# Patient Record
Sex: MALE | NOT HISPANIC OR LATINO | Employment: FULL TIME | URBAN - NONMETROPOLITAN AREA
[De-identification: names, ages, dates, MRNs, and addresses within clinical notes are randomized per-mention and may not be internally consistent; named-entity substitution may affect disease eponyms.]

---

## 2021-04-15 ENCOUNTER — OFFICE VISIT (OUTPATIENT)
Dept: FAMILY MEDICINE CLINIC | Facility: CLINIC | Age: 41
End: 2021-04-15

## 2021-04-15 ENCOUNTER — LAB (OUTPATIENT)
Dept: LAB | Facility: HOSPITAL | Age: 41
End: 2021-04-15

## 2021-04-15 VITALS
BODY MASS INDEX: 31.71 KG/M2 | DIASTOLIC BLOOD PRESSURE: 92 MMHG | OXYGEN SATURATION: 98 % | SYSTOLIC BLOOD PRESSURE: 142 MMHG | HEART RATE: 80 BPM | HEIGHT: 67 IN | WEIGHT: 202 LBS | TEMPERATURE: 97.2 F

## 2021-04-15 DIAGNOSIS — Z13.220 SCREENING FOR HYPERLIPIDEMIA: ICD-10-CM

## 2021-04-15 DIAGNOSIS — Z76.89 ENCOUNTER TO ESTABLISH CARE: Primary | ICD-10-CM

## 2021-04-15 DIAGNOSIS — I10 ESSENTIAL HYPERTENSION: ICD-10-CM

## 2021-04-15 DIAGNOSIS — Z13.1 SCREENING FOR DIABETES MELLITUS (DM): ICD-10-CM

## 2021-04-15 DIAGNOSIS — Z13.29 SCREENING FOR THYROID DISORDER: ICD-10-CM

## 2021-04-15 DIAGNOSIS — Z71.6 ENCOUNTER FOR SMOKING CESSATION COUNSELING: ICD-10-CM

## 2021-04-15 LAB
CHOLEST SERPL-MCNC: 232 MG/DL (ref 0–200)
HBA1C MFR BLD: 4.5 % (ref 4.8–5.6)
HDLC SERPL-MCNC: 50 MG/DL (ref 40–60)
LDLC SERPL CALC-MCNC: 163 MG/DL (ref 0–100)
LDLC/HDLC SERPL: 3.2 {RATIO}
TRIGL SERPL-MCNC: 109 MG/DL (ref 0–150)
TSH SERPL DL<=0.05 MIU/L-ACNC: 1.61 UIU/ML (ref 0.27–4.2)
VLDLC SERPL-MCNC: 19 MG/DL (ref 5–40)

## 2021-04-15 PROCEDURE — 80061 LIPID PANEL: CPT | Performed by: STUDENT IN AN ORGANIZED HEALTH CARE EDUCATION/TRAINING PROGRAM

## 2021-04-15 PROCEDURE — 84443 ASSAY THYROID STIM HORMONE: CPT | Performed by: STUDENT IN AN ORGANIZED HEALTH CARE EDUCATION/TRAINING PROGRAM

## 2021-04-15 PROCEDURE — 36415 COLL VENOUS BLD VENIPUNCTURE: CPT | Performed by: STUDENT IN AN ORGANIZED HEALTH CARE EDUCATION/TRAINING PROGRAM

## 2021-04-15 PROCEDURE — 83036 HEMOGLOBIN GLYCOSYLATED A1C: CPT | Performed by: STUDENT IN AN ORGANIZED HEALTH CARE EDUCATION/TRAINING PROGRAM

## 2021-04-15 PROCEDURE — 99203 OFFICE O/P NEW LOW 30 MIN: CPT | Performed by: STUDENT IN AN ORGANIZED HEALTH CARE EDUCATION/TRAINING PROGRAM

## 2021-04-15 RX ORDER — LORATADINE 10 MG/1
10 TABLET ORAL DAILY
COMMUNITY
Start: 2021-03-29

## 2021-04-15 RX ORDER — NICOTINE 21 MG/24HR
1 PATCH, TRANSDERMAL 24 HOURS TRANSDERMAL EVERY 24 HOURS
Qty: 28 EACH | Refills: 0 | Status: SHIPPED | OUTPATIENT
Start: 2021-04-15 | End: 2023-01-12

## 2021-04-15 RX ORDER — LOSARTAN POTASSIUM 50 MG/1
50 TABLET ORAL DAILY
Qty: 30 TABLET | Refills: 1 | Status: SHIPPED | OUTPATIENT
Start: 2021-04-15 | End: 2023-01-12 | Stop reason: SDUPTHER

## 2021-04-15 RX ORDER — LISINOPRIL 5 MG/1
5 TABLET ORAL DAILY
COMMUNITY
Start: 2021-03-29 | End: 2021-04-15

## 2021-04-15 NOTE — PROGRESS NOTES
I have reviewed the notes, assessments, and/or procedures performed by Essie Barraza MD, I concur with her/his documentation and assessment and plan for [facility].                This document has been electronically signed by Bhupendra Mckeon MD on April 15, 2021 17:00 CDT

## 2021-04-15 NOTE — PROGRESS NOTES
Family Medicine Residency  Essie Barraza MD    Subjective:     Laz Castillo is a 41 y.o. male who presents today to [facility] care. Patient has history of htn. He is currently taking Lisinopril 5 mg tablet over the last 6 months. He has been having dry cough over the last 2 months and has been using otc cough syrup with no relief. He has no complaints today.     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 Smokes cig pack a day >20 yrs. Tried to quit with e-cig. Would like to try nicotin patch. Goal to quit in 3 months. No ilicit drugs. Occasional etoh.  Patient is a an immigrant from [place] and lives alone here. Hasn't seen his family in over 10 yr.     Family history : father  with kidney disease at the age of 78. Mother has DM, htn and liver problem. Brother has kidney and DM issues. One of the sister has htn, lung disease.     Surgerical history: phimosis in 2021.    No known allergies.     The following portions of the patient's history were reviewed and updated as appropriate: allergies, current medications, past family history, past medical history, past social history, past surgical history and problem list.    Past Medical Hx:  History reviewed. No pertinent past medical history.    Past Surgical Hx:  History reviewed. No pertinent surgical history.    Current Meds:    Current Outpatient Medications:   •  loratadine (CLARITIN) 10 MG tablet, Take 10 mg by mouth Daily., Disp: , Rfl:   •  losartan (Cozaar) 50 MG tablet, Take 1 tablet by mouth Daily., Disp: 30 tablet, Rfl: 1  •  nicotine (NICODERM CQ) 21 MG/24HR patch, Place 1 patch on the skin as directed by provider Daily., Disp: 28 each, Rfl: 0    Allergies:  No Known Allergies    Family Hx:  History reviewed. No pertinent family history.     Social History:  Social History     Socioeconomic History   • Marital status:      Spouse name: Not on file   • Number of children: Not on file   • Years of education: Not on file  "  • Highest education level: Not on file   Tobacco Use   • Smoking status: Never Smoker   • Smokeless tobacco: Never Used   Substance and Sexual Activity   • Alcohol use: Yes     Comment: sometimes   • Drug use: Never   • Sexual activity: Defer       Review of Systems  Review of Systems   Constitutional: Negative for activity change, appetite change, chills, diaphoresis and fatigue.   HENT: Negative for congestion.    Eyes: Negative for visual disturbance.   Respiratory: Positive for cough. Negative for chest tightness, shortness of breath and wheezing.    Cardiovascular: Negative for chest pain, palpitations and leg swelling.   Gastrointestinal: Negative for abdominal pain, diarrhea, nausea and vomiting.   Genitourinary: Negative for difficulty urinating, frequency and urgency.   Musculoskeletal: Negative for arthralgias and myalgias.   Skin: Negative for color change.   Neurological: Negative for dizziness, weakness and light-headedness.   Psychiatric/Behavioral: Negative for behavioral problems, confusion and sleep disturbance. The patient is not nervous/anxious.        Objective:     /92   Pulse 80   Temp 97.2 °F (36.2 °C)   Ht 170.2 cm (67\")   Wt 91.6 kg (202 lb)   SpO2 98%   BMI 31.64 kg/m²   Physical Exam  Vitals and nursing note reviewed.   Constitutional:       General: He is not in acute distress.     Appearance: Normal appearance. He is normal weight. He is not ill-appearing or toxic-appearing.   HENT:      Head: Normocephalic and atraumatic.      Right Ear: External ear normal.      Left Ear: External ear normal.      Nose: Nose normal.      Mouth/Throat:      Mouth: Mucous membranes are moist.      Pharynx: Oropharynx is clear.   Eyes:      Extraocular Movements: Extraocular movements intact.      Conjunctiva/sclera: Conjunctivae normal.      Pupils: Pupils are equal, round, and reactive to light.   Cardiovascular:      Rate and Rhythm: Normal rate and regular rhythm.      Pulses: Normal " pulses.      Heart sounds: Normal heart sounds.   Pulmonary:      Effort: No respiratory distress.      Breath sounds: Normal breath sounds. No wheezing.   Abdominal:      General: Bowel sounds are normal.      Palpations: Abdomen is soft.      Tenderness: There is no abdominal tenderness. There is no guarding.   Musculoskeletal:         General: Normal range of motion.      Right lower leg: No edema.      Left lower leg: No edema.   Skin:     General: Skin is warm and dry.      Capillary Refill: Capillary refill takes less than 2 seconds.   Neurological:      General: No focal deficit present.      Mental Status: He is alert and oriented to person, place, and time.      Motor: No weakness.   Psychiatric:         Mood and Affect: Mood normal.         Behavior: Behavior normal.          Assessment/Plan:   41 yr male with no significant past medical history presents today to establish care at the clinic. Patient requested for routine labs to be done today. He is motivated to quit smoking and would like try Nicotine patches. >20 mins was spent counseling patient regarding smoking cessation.  Since patient is having some dry cough since starting the Lisinopril, will switch it to Losartan and see if the cough improves. In addition, patient is a chronic smoker therefore will order chest x-ray to rule out any other etiologies.     Diagnoses and all orders for this visit:    1. Encounter to establish care (Primary)    2. Screening for thyroid disorder  -     TSH    3. Screening for hyperlipidemia  -     Lipid panel    4. Screening for diabetes mellitus (DM)  -     Hemoglobin A1c    5. Essential hypertension  -     losartan (Cozaar) 50 MG tablet; Take 1 tablet by mouth Daily.  Dispense: 30 tablet; Refill: 1    6. Encounter for smoking cessation counseling  -     XR Chest PA & Lateral  -     nicotine (NICODERM CQ) 21 MG/24HR patch; Place 1 patch on the skin as directed by provider Daily.  Dispense: 28 each; Refill:  0        · Rx changes: changed from Lisinopril to Lorsartan   · Patient Education: smoking cessation   · Compliance at present is estimated to be good.   · Efforts to improve compliance (if necessary) will be directed at increased exercise.    Depression screening: Up to date; last screen      Follow-up:     Return in about 4 weeks (around 5/13/2021), or if symptoms worsen or fail to improve, for Recheck.    Preventative:  Health Maintenance   Topic Date Due   • ANNUAL PHYSICAL  Never done   • COVID-19 Vaccine (1) Never done   • INFLUENZA VACCINE  08/01/2021   • TDAP/TD VACCINES (2 - Td) 12/10/2028   • HEPATITIS C SCREENING  Completed   • Pneumococcal Vaccine 0-64  Aged Out       Recommended: none  Vaccine Counseling: N/A    Weight  -Class: Obese Class I: 30-34.9kg/m2  -Patient's Body mass index is 31.64 kg/m². BMI is within normal parameters. No follow-up required..   eat more fruits and vegetables, decrease soda or juice intake, increase water intake, increase physical activity, reduce screen time, reduce portion size, cut out extra servings and reduce fast food intake    Alcohol use:  reports current alcohol use.  Nicotine status  reports that he has never smoked. He has never used smokeless tobacco.    Goals    None         RISK SCORE: 2        Essie Barraza MD PGY-1  [facility] Family Medicine Residency   This document has been electronically signed by Essie Barraza MD on April 15, 2021 16:52 CDT

## 2021-08-06 ENCOUNTER — TELEPHONE (OUTPATIENT)
Dept: FAMILY MEDICINE CLINIC | Facility: CLINIC | Age: 41
End: 2021-08-06

## 2021-08-25 ENCOUNTER — OFFICE VISIT (OUTPATIENT)
Dept: FAMILY MEDICINE CLINIC | Facility: CLINIC | Age: 41
End: 2021-08-25

## 2021-08-25 VITALS
SYSTOLIC BLOOD PRESSURE: 132 MMHG | DIASTOLIC BLOOD PRESSURE: 80 MMHG | WEIGHT: 201.5 LBS | OXYGEN SATURATION: 98 % | BODY MASS INDEX: 31.63 KG/M2 | HEIGHT: 67 IN | HEART RATE: 74 BPM

## 2021-08-25 DIAGNOSIS — I10 ESSENTIAL HYPERTENSION: Primary | ICD-10-CM

## 2021-08-25 DIAGNOSIS — R05.9 COUGH: ICD-10-CM

## 2021-08-25 PROCEDURE — 99213 OFFICE O/P EST LOW 20 MIN: CPT | Performed by: CHIROPRACTOR

## 2021-08-25 NOTE — PROGRESS NOTES
Family Medicine Residency  Doug Poe MD    Subjective:     Laz Castillo is a 41 y.o. male who presents for cough and hypertension.  He reports that since the last visit his cough is completely resolved and is no longer an issue for him.  He continues on his hypertensive medication Cozaar 50 mg daily.  He does not take his blood pressure at home.  He attempted to take the nicotine patch in an effort to stop smoking however he was unsuccessful.  He is currently smoking 1 pack/day.  He endorses occasional mild temporal headaches that are not present at this time.  He is here today primarily for a checkup to make sure that he is healthy enough to receive the Covid vaccination.  He works for [employer] and they are requiring all their employees to get vaccinated.    The following portions of the patient's history were reviewed and updated as appropriate: allergies, current medications, past family history, past medical history, past social history, past surgical history and problem list.    Past Medical Hx:  History reviewed. No pertinent past medical history.    Past Surgical Hx:  History reviewed. No pertinent surgical history.    Current Meds:    Current Outpatient Medications:   •  loratadine (CLARITIN) 10 MG tablet, Take 10 mg by mouth Daily., Disp: , Rfl:   •  losartan (Cozaar) 50 MG tablet, Take 1 tablet by mouth Daily., Disp: 30 tablet, Rfl: 1  •  nicotine (NICODERM CQ) 21 MG/24HR patch, Place 1 patch on the skin as directed by provider Daily., Disp: 28 each, Rfl: 0    Allergies:  No Known Allergies    Family Hx:  History reviewed. No pertinent family history.     Social History:  Social History     Socioeconomic History   • Marital status:      Spouse name: Not on file   • Number of children: Not on file   • Years of education: Not on file   • Highest education level: Not on file   Tobacco Use   • Smoking status: Never Smoker   • Smokeless tobacco: Never Used   Substance and Sexual Activity   •  "Alcohol use: Yes     Comment: sometimes   • Drug use: Never   • Sexual activity: Defer       Review of Systems  Review of Systems   Constitutional: Negative for appetite change, chills, diaphoresis, fatigue, fever and unexpected weight change.   HENT: Negative for congestion, dental problem, ear discharge, ear pain, hearing loss, mouth sores, rhinorrhea, sinus pressure, sinus pain, sore throat and trouble swallowing.    Eyes: Negative for pain, discharge, redness and visual disturbance.   Respiratory: Negative for cough, chest tightness, shortness of breath and wheezing.    Cardiovascular: Negative for chest pain, palpitations and leg swelling.   Gastrointestinal: Negative for abdominal pain, blood in stool, constipation, diarrhea, nausea and vomiting.   Endocrine: Negative for cold intolerance and heat intolerance.   Genitourinary: Negative for difficulty urinating, dysuria and hematuria.   Musculoskeletal: Negative for back pain, gait problem and joint swelling.   Skin: Positive for wound. Negative for color change.        Reports burning the back of his left hand.   Neurological: Positive for headaches. Negative for dizziness, tremors, seizures, weakness and numbness.        Occasional headaches in the temporal region.   Hematological: Negative for adenopathy.   Psychiatric/Behavioral: Negative for behavioral problems, confusion, hallucinations and sleep disturbance.       Objective:     /80   Pulse 74   Ht 170.2 cm (67\")   Wt 91.4 kg (201 lb 8 oz)   SpO2 98%   BMI 31.56 kg/m²   Physical Exam  Constitutional:       Appearance: Normal appearance. He is not ill-appearing or diaphoretic.   HENT:      Head: Normocephalic and atraumatic.      Right Ear: Tympanic membrane, ear canal and external ear normal. There is no impacted cerumen.      Left Ear: Tympanic membrane, ear canal and external ear normal. There is no impacted cerumen.      Nose: No congestion or rhinorrhea.      Mouth/Throat:      Pharynx: No " posterior oropharyngeal erythema.   Eyes:      General: No scleral icterus.        Right eye: No discharge.         Left eye: No discharge.      Extraocular Movements: Extraocular movements intact.      Pupils: Pupils are equal, round, and reactive to light.   Neck:      Vascular: No carotid bruit.   Cardiovascular:      Rate and Rhythm: Normal rate and regular rhythm.      Pulses: Normal pulses.      Heart sounds: Normal heart sounds. No murmur heard.     Pulmonary:      Effort: Pulmonary effort is normal.      Breath sounds: Normal breath sounds. No wheezing or rhonchi.   Chest:      Chest wall: No tenderness.   Abdominal:      General: Bowel sounds are normal.      Palpations: Abdomen is soft. There is no mass.      Tenderness: There is no abdominal tenderness.      Hernia: No hernia is present.   Musculoskeletal:         General: No swelling, tenderness, deformity or signs of injury.      Cervical back: Neck supple. No rigidity. No muscular tenderness.      Right lower leg: No edema.      Left lower leg: No edema.   Skin:     General: Skin is warm and dry.      Capillary Refill: Capillary refill takes 2 to 3 seconds.      Findings: Lesion present. No erythema or rash.      Comments: 1 cm healing burn dorsum left hand.   Neurological:      General: No focal deficit present.      Mental Status: He is alert and oriented to person, place, and time.      Cranial Nerves: No cranial nerve deficit.      Motor: No weakness.      Coordination: Coordination normal.   Psychiatric:         Mood and Affect: Mood normal.         Behavior: Behavior normal.         Thought Content: Thought content normal.         Judgment: Judgment normal.          Assessment/Plan:     Diagnoses and all orders for this visit:    1. Essential hypertension (Primary)  -Patient with history of hypertension  -Does not routinely monitor his blood pressure at home  -Currently on Cozaar 50 mg daily  -Blood pressure today 132/80.  -Patient with recent  attempt to quit smoking but was unsuccessful in nicotine patches  -Currently smokes 1 pack/day  -No desire to quit at this time.  -Advised patient that when he was ready to quit we had other pharmacological options for him.    2. Cough  -Presented at last visit with cough  -Completely resolved at this time.      · Rx changes: None  · Patient Education: The importance of smoking cessation when he is ready to make the attempt  · Compliance at present is estimated to be fair.   · Efforts to improve compliance (if necessary) will be directed at Continued attention to smoking on future visits..           Follow-up:     Return in about 6 months (around 2/25/2022) for Recheck.    Preventative:  Health Maintenance   Topic Date Due   • ANNUAL PHYSICAL  Never done   • COVID-19 Vaccine (1) Never done   • INFLUENZA VACCINE  10/01/2021   • TDAP/TD VACCINES (2 - Td or Tdap) 12/10/2028   • HEPATITIS C SCREENING  Completed   • Pneumococcal Vaccine 0-64  Aged Out         Alcohol use:  reports current alcohol use.  Nicotine status  reports that he has never smoked. He has never used smokeless tobacco.    Goals    None         RISK SCORE: 2     There is currently no medical reason why the patient cannot or should not receive the Covid-19 vaccination at this time.        This document has been electronically signed by Doug Poe MD on August 25, 2021 09:47 CDT

## 2022-02-25 ENCOUNTER — OFFICE VISIT (OUTPATIENT)
Dept: FAMILY MEDICINE CLINIC | Facility: CLINIC | Age: 42
End: 2022-02-25

## 2022-02-25 VITALS
WEIGHT: 202.7 LBS | HEART RATE: 78 BPM | SYSTOLIC BLOOD PRESSURE: 138 MMHG | TEMPERATURE: 97.5 F | BODY MASS INDEX: 31.81 KG/M2 | DIASTOLIC BLOOD PRESSURE: 70 MMHG | OXYGEN SATURATION: 98 % | HEIGHT: 67 IN

## 2022-02-25 DIAGNOSIS — I10 BENIGN ESSENTIAL HTN: ICD-10-CM

## 2022-02-25 DIAGNOSIS — E78.2 MODERATE MIXED HYPERLIPIDEMIA NOT REQUIRING STATIN THERAPY: ICD-10-CM

## 2022-02-25 DIAGNOSIS — Z00.00 ENCOUNTER FOR ANNUAL PHYSICAL EXAM: Primary | ICD-10-CM

## 2022-02-25 PROCEDURE — 99213 OFFICE O/P EST LOW 20 MIN: CPT | Performed by: STUDENT IN AN ORGANIZED HEALTH CARE EDUCATION/TRAINING PROGRAM

## 2022-02-25 RX ORDER — SIMVASTATIN 5 MG
5 TABLET ORAL NIGHTLY
Qty: 30 TABLET | Refills: 3 | Status: SHIPPED | OUTPATIENT
Start: 2022-02-25 | End: 2023-01-12 | Stop reason: SDUPTHER

## 2022-02-25 NOTE — PROGRESS NOTES
Family Medicine Residency  Essie Barraza MD    Subjective:     Laz Castillo is a 42 y.o. male with concurrent medical history of essential HTN and tobacco use who presents for HTN follow up and annual physical exam. Patient was last seen on 8/25/21 by Dr. Poe, reported his cough improved after changing his BP medication to Losartan. Since his last visit patient has been feeling well and has no new issues to discuss today. Patient continues to smoke pack a day and has been unsuccessful in quitting with nicotine patch or gum. Patient reports he is motivated to quit smoking and would like to do it without using any alternative agents. Denies any chest pain, palpitations, dizziness, nausea, vomiting, abdominal pain, fevers/chills.     The following portions of the patient's history were reviewed and updated as appropriate: allergies, current medications, past family history, past medical history, past social history, past surgical history and problem list.    Past Medical Hx:  History reviewed. No pertinent past medical history.    Past Surgical Hx:  History reviewed. No pertinent surgical history.    Current Meds:    Current Outpatient Medications:   •  loratadine (CLARITIN) 10 MG tablet, Take 10 mg by mouth Daily., Disp: , Rfl:   •  losartan (Cozaar) 50 MG tablet, Take 1 tablet by mouth Daily., Disp: 30 tablet, Rfl: 1  •  nicotine (NICODERM CQ) 21 MG/24HR patch, Place 1 patch on the skin as directed by provider Daily., Disp: 28 each, Rfl: 0  •  simvastatin (Zocor) 5 MG tablet, Take 1 tablet by mouth Every Night., Disp: 30 tablet, Rfl: 3    Allergies:  No Known Allergies    Family Hx:  History reviewed. No pertinent family history.     Social History:  Social History     Socioeconomic History   • Marital status:    Tobacco Use   • Smoking status: Never Smoker   • Smokeless tobacco: Never Used   Vaping Use   • Vaping Use: Never used   Substance and Sexual Activity   • Alcohol use: Yes     Comment:  "sometimes   • Drug use: Never   • Sexual activity: Defer       Review of Systems  Review of Systems   Constitutional: Negative for activity change, appetite change, diaphoresis, fatigue, fever and unexpected weight change.   Respiratory: Negative for cough, chest tightness, shortness of breath and wheezing.    Cardiovascular: Negative for chest pain, palpitations and leg swelling.   Gastrointestinal: Negative for abdominal pain, diarrhea, nausea and vomiting.   Genitourinary: Negative for difficulty urinating, flank pain, frequency and urgency.   Musculoskeletal: Negative for arthralgias and myalgias.   Skin: Negative for color change and rash.   Neurological: Negative for dizziness, weakness, light-headedness and headaches.   Psychiatric/Behavioral: Negative for behavioral problems, decreased concentration and sleep disturbance. The patient is not nervous/anxious.        Objective:     /70   Pulse 78   Temp 97.5 °F (36.4 °C)   Ht 170.2 cm (67\")   Wt 91.9 kg (202 lb 11.2 oz)   SpO2 98%   BMI 31.75 kg/m²   Physical Exam  Vitals and nursing note reviewed.   Constitutional:       General: He is not in acute distress.     Appearance: Normal appearance. He is not ill-appearing, toxic-appearing or diaphoretic.      Comments: Overweight    HENT:      Head: Normocephalic and atraumatic.   Cardiovascular:      Rate and Rhythm: Normal rate and regular rhythm.      Pulses: Normal pulses.      Heart sounds: Normal heart sounds.   Pulmonary:      Effort: Pulmonary effort is normal. No respiratory distress.      Breath sounds: Normal breath sounds. No wheezing.   Abdominal:      General: Bowel sounds are normal.      Palpations: Abdomen is soft.      Tenderness: There is no abdominal tenderness. There is no guarding.   Musculoskeletal:      Cervical back: Normal range of motion and neck supple.      Right lower leg: No edema.      Left lower leg: No edema.   Skin:     General: Skin is warm and dry.      Capillary " Refill: Capillary refill takes less than 2 seconds.   Neurological:      General: No focal deficit present.      Mental Status: He is alert and oriented to person, place, and time.   Psychiatric:         Mood and Affect: Mood normal.         Behavior: Behavior normal.          Assessment/Plan:   Laz Castillo is a 42 y.o. male with concurrent medical history of essential HTN and tobacco use who presents for HTN follow up and annual physical exam. Patient is doing well overall on his current medications. Reviewed recent Lipid profile and started on statin therapy due to strong family history of hyperlipidemia and smoking history. Again counseled on smoking cessation. BP well controlled on Losartan.     Diagnoses and all orders for this visit:    1. Encounter for annual physical exam (Primary)    2. Moderate mixed hyperlipidemia not requiring statin therapy  -     simvastatin (Zocor) 5 MG tablet; Take 1 tablet by mouth Every Night.  Dispense: 30 tablet; Refill: 3    3. Benign essential HTN        · Rx changes: none  · Patient Education: Smoking cessation   · Compliance at present is estimated to be good.   · Efforts to improve compliance (if necessary) will be directed at dietary modifications: DASH diet  and increased exercise.    Depression screening: Up to date; last screen      Follow-up:     Return in about 6 months (around 8/25/2022) for Recheck.    Preventative:  Health Maintenance   Topic Date Due   • COVID-19 Vaccine (3 - Booster for Pfizer series) 02/15/2022   • INFLUENZA VACCINE  02/25/2023 (Originally 8/1/2021)   • LIPID PANEL  04/15/2022   • ANNUAL PHYSICAL  02/26/2023   • TDAP/TD VACCINES (2 - Td or Tdap) 12/10/2028   • HEPATITIS C SCREENING  Completed   • Pneumococcal Vaccine 0-64  Aged Out       Recommended: none  Vaccine Counseling: N/A    Weight  -Class: Overweight: 25.0-29.9kg/m2   -Patient's Body mass index is 31.75 kg/m². indicating that he is overweight (BMI 25-29.9). Patient's (Body mass index  is 31.75 kg/m².) indicates that they are overweight with health conditions that include obstructive sleep apnea, hypertension, coronary heart disease, diabetes mellitus, dyslipidemias, GERD, peripheral vascular disease and osteoarthritis . Weight is unchanged. BMI is 31. We discussed low calorie, low carb based diet program, portion control and increasing exercise. .   eat more fruits and vegetables, decrease soda or juice intake, increase water intake, increase physical activity, reduce screen time, reduce portion size, cut out extra servings, reduce fast food intake and plan meals    Alcohol use:  reports current alcohol use.  Nicotine status  reports that he has never smoked. He has never used smokeless tobacco.    Goals    None         RISK SCORE: 3        Essie Barraza MD PGY-2  [facility] Family Medicine Residency   This document has been electronically signed by Essie Barraza MD on February 25, 2022 14:50 CST

## 2022-10-06 ENCOUNTER — TELEPHONE (OUTPATIENT)
Dept: FAMILY MEDICINE CLINIC | Facility: CLINIC | Age: 42
End: 2022-10-06

## 2022-10-06 NOTE — TELEPHONE ENCOUNTER
Patient called returning Dr Samson phone call. He stated he would like for someone to call him back tomorrow morning because he wont be available this afternoon.    His#[phone]

## 2023-01-12 ENCOUNTER — LAB (OUTPATIENT)
Dept: LAB | Facility: HOSPITAL | Age: 43
End: 2023-01-12
Payer: COMMERCIAL

## 2023-01-12 ENCOUNTER — OFFICE VISIT (OUTPATIENT)
Dept: FAMILY MEDICINE CLINIC | Facility: CLINIC | Age: 43
End: 2023-01-12
Payer: COMMERCIAL

## 2023-01-12 VITALS
HEART RATE: 71 BPM | HEIGHT: 67 IN | DIASTOLIC BLOOD PRESSURE: 86 MMHG | SYSTOLIC BLOOD PRESSURE: 140 MMHG | OXYGEN SATURATION: 97 % | WEIGHT: 193.2 LBS | BODY MASS INDEX: 30.32 KG/M2

## 2023-01-12 DIAGNOSIS — I10 PRIMARY HYPERTENSION: ICD-10-CM

## 2023-01-12 DIAGNOSIS — Z12.11 COLON CANCER SCREENING: ICD-10-CM

## 2023-01-12 DIAGNOSIS — R06.83 LOUD SNORING: ICD-10-CM

## 2023-01-12 DIAGNOSIS — D22.9 NUMEROUS SKIN MOLES: ICD-10-CM

## 2023-01-12 DIAGNOSIS — E78.00 HYPERCHOLESTEREMIA: ICD-10-CM

## 2023-01-12 DIAGNOSIS — E78.2 MODERATE MIXED HYPERLIPIDEMIA NOT REQUIRING STATIN THERAPY: ICD-10-CM

## 2023-01-12 DIAGNOSIS — I10 ESSENTIAL HYPERTENSION: Primary | ICD-10-CM

## 2023-01-12 LAB
ALBUMIN SERPL-MCNC: 4.5 G/DL (ref 3.5–5.2)
ALBUMIN/GLOB SERPL: 1.6 G/DL
ALP SERPL-CCNC: 84 U/L (ref 39–117)
ALT SERPL W P-5'-P-CCNC: 38 U/L (ref 1–41)
ANION GAP SERPL CALCULATED.3IONS-SCNC: 12.5 MMOL/L (ref 5–15)
AST SERPL-CCNC: 35 U/L (ref 1–40)
BASOPHILS # BLD AUTO: 0.11 10*3/MM3 (ref 0–0.2)
BASOPHILS NFR BLD AUTO: 1.4 % (ref 0–1.5)
BILIRUB SERPL-MCNC: 1.2 MG/DL (ref 0–1.2)
BUN SERPL-MCNC: 14 MG/DL (ref 6–20)
BUN/CREAT SERPL: 18.9 (ref 7–25)
CALCIUM SPEC-SCNC: 9.2 MG/DL (ref 8.6–10.5)
CHLORIDE SERPL-SCNC: 101 MMOL/L (ref 98–107)
CHOLEST SERPL-MCNC: 207 MG/DL (ref 0–200)
CO2 SERPL-SCNC: 23.5 MMOL/L (ref 22–29)
CREAT SERPL-MCNC: 0.74 MG/DL (ref 0.76–1.27)
DEPRECATED RDW RBC AUTO: 44.1 FL (ref 37–54)
EGFRCR SERPLBLD CKD-EPI 2021: 113.9 ML/MIN/1.73
EOSINOPHIL # BLD AUTO: 0.45 10*3/MM3 (ref 0–0.4)
EOSINOPHIL NFR BLD AUTO: 5.8 % (ref 0.3–6.2)
ERYTHROCYTE [DISTWIDTH] IN BLOOD BY AUTOMATED COUNT: 12.8 % (ref 12.3–15.4)
GLOBULIN UR ELPH-MCNC: 2.9 GM/DL
GLUCOSE SERPL-MCNC: 81 MG/DL (ref 65–99)
HCT VFR BLD AUTO: 45.8 % (ref 37.5–51)
HDLC SERPL-MCNC: 50 MG/DL (ref 40–60)
HGB BLD-MCNC: 14.8 G/DL (ref 13–17.7)
IMM GRANULOCYTES # BLD AUTO: 0.02 10*3/MM3 (ref 0–0.05)
IMM GRANULOCYTES NFR BLD AUTO: 0.3 % (ref 0–0.5)
LDLC SERPL CALC-MCNC: 134 MG/DL (ref 0–100)
LDLC/HDLC SERPL: 2.63 {RATIO}
LYMPHOCYTES # BLD AUTO: 2.59 10*3/MM3 (ref 0.7–3.1)
LYMPHOCYTES NFR BLD AUTO: 33.5 % (ref 19.6–45.3)
MCH RBC QN AUTO: 30.7 PG (ref 26.6–33)
MCHC RBC AUTO-ENTMCNC: 32.3 G/DL (ref 31.5–35.7)
MCV RBC AUTO: 95 FL (ref 79–97)
MONOCYTES # BLD AUTO: 0.73 10*3/MM3 (ref 0.1–0.9)
MONOCYTES NFR BLD AUTO: 9.4 % (ref 5–12)
NEUTROPHILS NFR BLD AUTO: 3.83 10*3/MM3 (ref 1.7–7)
NEUTROPHILS NFR BLD AUTO: 49.6 % (ref 42.7–76)
NRBC BLD AUTO-RTO: 0 /100 WBC (ref 0–0.2)
PLATELET # BLD AUTO: 306 10*3/MM3 (ref 140–450)
PMV BLD AUTO: 9.6 FL (ref 6–12)
POTASSIUM SERPL-SCNC: 3.9 MMOL/L (ref 3.5–5.2)
PROT SERPL-MCNC: 7.4 G/DL (ref 6–8.5)
RBC # BLD AUTO: 4.82 10*6/MM3 (ref 4.14–5.8)
SODIUM SERPL-SCNC: 137 MMOL/L (ref 136–145)
TRIGL SERPL-MCNC: 127 MG/DL (ref 0–150)
VLDLC SERPL-MCNC: 23 MG/DL (ref 5–40)
WBC NRBC COR # BLD: 7.73 10*3/MM3 (ref 3.4–10.8)

## 2023-01-12 PROCEDURE — 80053 COMPREHEN METABOLIC PANEL: CPT | Performed by: STUDENT IN AN ORGANIZED HEALTH CARE EDUCATION/TRAINING PROGRAM

## 2023-01-12 PROCEDURE — 36415 COLL VENOUS BLD VENIPUNCTURE: CPT | Performed by: STUDENT IN AN ORGANIZED HEALTH CARE EDUCATION/TRAINING PROGRAM

## 2023-01-12 PROCEDURE — 99213 OFFICE O/P EST LOW 20 MIN: CPT | Performed by: STUDENT IN AN ORGANIZED HEALTH CARE EDUCATION/TRAINING PROGRAM

## 2023-01-12 PROCEDURE — 85025 COMPLETE CBC W/AUTO DIFF WBC: CPT | Performed by: STUDENT IN AN ORGANIZED HEALTH CARE EDUCATION/TRAINING PROGRAM

## 2023-01-12 PROCEDURE — 80061 LIPID PANEL: CPT | Performed by: STUDENT IN AN ORGANIZED HEALTH CARE EDUCATION/TRAINING PROGRAM

## 2023-01-12 RX ORDER — LOSARTAN POTASSIUM 50 MG/1
50 TABLET ORAL DAILY
Qty: 30 TABLET | Refills: 5 | Status: SHIPPED | OUTPATIENT
Start: 2023-01-12

## 2023-01-12 RX ORDER — SIMVASTATIN 10 MG
10 TABLET ORAL NIGHTLY
Qty: 30 TABLET | Refills: 5 | Status: SHIPPED | OUTPATIENT
Start: 2023-01-12

## 2023-01-12 NOTE — PROGRESS NOTES
Family Medicine Residency  Essie Barraza MD    Subjective:     Laz Castillo is a 43 y.o. male who presents for medication refill and laboratory work up. Patient presented with his girlfriend who was concerned about his breathing during sleep. Patient seems to snore loudly and occasionally holds his breath. Patient works 12 hr shift and has fatigue at baseline. Patient continues to smoke pack a day and is not motivated to quit anytime soon. Patient has not been taking his bp medication and statin. Patient's partner is concerned about a raised skin lesion on the posterior neck region and requested for dermatology referral.      The following portions of the patient's history were reviewed and updated as appropriate: allergies, current medications, past family history, past medical history, past social history, past surgical history and problem list.    Past Medical Hx:  History reviewed. No pertinent past medical history.    Past Surgical Hx:  History reviewed. No pertinent surgical history.    Current Meds:    Current Outpatient Medications:   •  losartan (Cozaar) 50 MG tablet, Take 1 tablet by mouth Daily., Disp: 30 tablet, Rfl: 5  •  simvastatin (Zocor) 10 MG tablet, Take 1 tablet by mouth Every Night., Disp: 30 tablet, Rfl: 5  •  loratadine (CLARITIN) 10 MG tablet, Take 10 mg by mouth Daily., Disp: , Rfl:     Allergies:  No Known Allergies    Family Hx:  History reviewed. No pertinent family history.     Social History:  Social History     Socioeconomic History   • Marital status:    Tobacco Use   • Smoking status: Never   • Smokeless tobacco: Never   Vaping Use   • Vaping Use: Never used   Substance and Sexual Activity   • Alcohol use: Yes     Comment: sometimes   • Drug use: Never   • Sexual activity: Defer       Review of Systems  Review of Systems   Constitutional: Negative for activity change, appetite change and fatigue.   Respiratory: Negative for cough, chest tightness, shortness of breath and  "wheezing.    Cardiovascular: Negative for chest pain, palpitations and leg swelling.   Gastrointestinal: Negative for abdominal pain, diarrhea, nausea and vomiting.   Genitourinary: Negative for difficulty urinating, hematuria and urgency.   Musculoskeletal: Positive for arthralgias. Negative for myalgias.   Skin: Negative for color change and rash.   Neurological: Positive for weakness. Negative for dizziness, seizures and light-headedness.   Psychiatric/Behavioral: Positive for sleep disturbance. Negative for behavioral problems and decreased concentration. The patient is not nervous/anxious.        Objective:     /86   Pulse 71   Ht 170.2 cm (67\")   Wt 87.6 kg (193 lb 3.2 oz)   SpO2 97%   BMI 30.26 kg/m²   Physical Exam  Vitals and nursing note reviewed.   Constitutional:       General: He is not in acute distress.     Appearance: Normal appearance. He is obese. He is not ill-appearing, toxic-appearing or diaphoretic.   HENT:      Right Ear: Tympanic membrane, ear canal and external ear normal.      Left Ear: Tympanic membrane, ear canal and external ear normal.      Mouth/Throat:      Mouth: Mucous membranes are moist.      Pharynx: Oropharynx is clear.   Cardiovascular:      Rate and Rhythm: Normal rate and regular rhythm.      Pulses: Normal pulses.      Heart sounds: Normal heart sounds. No murmur heard.  Pulmonary:      Effort: Pulmonary effort is normal. No respiratory distress.      Breath sounds: Normal breath sounds. No wheezing.   Abdominal:      General: Bowel sounds are normal.      Palpations: Abdomen is soft.      Tenderness: There is no abdominal tenderness. There is no guarding.   Musculoskeletal:      Right lower leg: No edema.      Left lower leg: No edema.   Skin:     General: Skin is warm and dry.      Capillary Refill: Capillary refill takes less than 2 seconds.      Comments: Approximately 0.3 cm black raised mole present on the posterior neck region    Neurological:      General: " No focal deficit present.      Mental Status: He is alert and oriented to person, place, and time.   Psychiatric:         Behavior: Behavior normal.          Assessment/Plan:   Laz Castillo is a 43 y.o. male who presents for medication refill and laboratory work up. Since patient is having increased snoring at night, will send the patient to sleep medicine for apnea work up. Also will get blood work done and refilled the medication below. Encouraged compliance with the medications.     Diagnoses and all orders for this visit:    1. Essential hypertension (Primary)  -     losartan (Cozaar) 50 MG tablet; Take 1 tablet by mouth Daily.  Dispense: 30 tablet; Refill: 5    2. Colon cancer screening  -     Ambulatory Referral For Screening Colonoscopy    3. Hypercholesteremia  -     Lipid panel    4. Loud snoring  -     Ambulatory Referral to Sleep Medicine    5. Primary hypertension  -     CBC w AUTO Differential  -     Comprehensive metabolic panel    6. Moderate mixed hyperlipidemia not requiring statin therapy  -     simvastatin (Zocor) 10 MG tablet; Take 1 tablet by mouth Every Night.  Dispense: 30 tablet; Refill: 5    7. Numerous skin moles  -     Ambulatory Referral to Dermatology        · Rx changes: none  · Patient Education: diet and exercise. DASH diet   · Compliance at present is estimated to be good.   · Efforts to improve compliance (if necessary) will be directed at increased exercise.    Depression screening: Up to date; last screen      Follow-up:     Return in about 4 weeks (around 2/9/2023) for Recheck.    Preventative:  Health Maintenance   Topic Date Due   • COLORECTAL CANCER SCREENING  Never done   • COVID-19 Vaccine (3 - Booster for Pfizer series) 11/10/2021   • LIPID PANEL  04/15/2022   • INFLUENZA VACCINE  Never done   • ANNUAL PHYSICAL  02/25/2023   • TDAP/TD VACCINES (2 - Td or Tdap) 12/10/2028   • HEPATITIS C SCREENING  Completed   • Pneumococcal Vaccine 0-64  Aged Out           Alcohol use:   reports current alcohol use.  Nicotine status  reports that he has never smoked. He has never used smokeless tobacco.     Goals    None         RISK SCORE: 3        Essie Barraza MD PGY-3  [facility] Family Medicine Residency   This document has been electronically signed by Essie Barraza MD on January 12, 2023 13:00 CST

## 2023-01-19 NOTE — PROGRESS NOTES
I have reviewed the notes, assessments, and/or procedures performed by Essie Barraza MD during office visit. I concur with her/his documentation and assessment and plan for [facility].           This document has been electronically signed by Bhupendra Mckeon MD on January 19, 2023 10:49 CST

## 2023-02-07 ENCOUNTER — OFFICE VISIT (OUTPATIENT)
Dept: GASTROENTEROLOGY | Facility: CLINIC | Age: 43
End: 2023-02-07
Payer: COMMERCIAL

## 2023-02-07 VITALS
WEIGHT: 197.4 LBS | HEIGHT: 67 IN | BODY MASS INDEX: 30.98 KG/M2 | DIASTOLIC BLOOD PRESSURE: 81 MMHG | SYSTOLIC BLOOD PRESSURE: 118 MMHG | HEART RATE: 70 BPM

## 2023-02-07 DIAGNOSIS — Z80.0 FAMILY HISTORY OF COLON CANCER: ICD-10-CM

## 2023-02-07 DIAGNOSIS — Z12.11 ENCOUNTER FOR SCREENING FOR MALIGNANT NEOPLASM OF COLON: Primary | ICD-10-CM

## 2023-02-07 PROCEDURE — 99203 OFFICE O/P NEW LOW 30 MIN: CPT | Performed by: INTERNAL MEDICINE

## 2023-02-07 RX ORDER — TRIAMCINOLONE ACETONIDE 1 MG/G
CREAM TOPICAL
COMMUNITY
Start: 2023-01-18

## 2023-02-07 RX ORDER — TRIAMCINOLONE ACETONIDE 1 MG/G
CREAM TOPICAL
COMMUNITY
Start: 2023-01-18 | End: 2024-01-19

## 2023-02-07 RX ORDER — SODIUM CHLORIDE 9 MG/ML
40 INJECTION, SOLUTION INTRAVENOUS AS NEEDED
Status: CANCELLED | OUTPATIENT
Start: 2023-02-28

## 2023-02-07 RX ORDER — DEXTROSE AND SODIUM CHLORIDE 5; .45 G/100ML; G/100ML
30 INJECTION, SOLUTION INTRAVENOUS CONTINUOUS PRN
Status: CANCELLED | OUTPATIENT
Start: 2023-02-28

## 2023-02-07 RX ORDER — SODIUM, POTASSIUM,MAG SULFATES 17.5-3.13G
SOLUTION, RECONSTITUTED, ORAL ORAL
Qty: 354 ML | Refills: 0 | Status: SHIPPED | OUTPATIENT
Start: 2023-02-07 | End: 2023-03-07

## 2023-02-13 ENCOUNTER — OFFICE VISIT (OUTPATIENT)
Dept: FAMILY MEDICINE CLINIC | Facility: CLINIC | Age: 43
End: 2023-02-13
Payer: COMMERCIAL

## 2023-02-13 VITALS
BODY MASS INDEX: 31.04 KG/M2 | DIASTOLIC BLOOD PRESSURE: 78 MMHG | OXYGEN SATURATION: 97 % | SYSTOLIC BLOOD PRESSURE: 128 MMHG | HEART RATE: 65 BPM | HEIGHT: 67 IN | WEIGHT: 197.8 LBS

## 2023-02-13 DIAGNOSIS — I10 ESSENTIAL HYPERTENSION: Primary | ICD-10-CM

## 2023-02-13 PROCEDURE — 99213 OFFICE O/P EST LOW 20 MIN: CPT | Performed by: STUDENT IN AN ORGANIZED HEALTH CARE EDUCATION/TRAINING PROGRAM

## 2023-02-13 RX ORDER — SIMVASTATIN 10 MG
10 TABLET ORAL NIGHTLY
Qty: 30 TABLET | Refills: 5 | Status: CANCELLED | OUTPATIENT
Start: 2023-02-13

## 2023-02-13 RX ORDER — LOSARTAN POTASSIUM 50 MG/1
50 TABLET ORAL DAILY
Qty: 30 TABLET | Refills: 5 | Status: CANCELLED | OUTPATIENT
Start: 2023-02-13

## 2023-02-13 NOTE — PROGRESS NOTES
Family Medicine Residency  Essie Barraza MD    Subjective:     Laz Castillo is a 43 y.o. male who presents for blood pressure check. Patient at his last visit was given referral to dermatology for skin lesion and sleep medicine due to snoring. Also restarted his Losartan and zocor as he was not taking them at that time.  Since restarting the medication BP has been better and is not having any side effects. Patient is schedled for colonscopy end of this month.  Patient continues to smoke pack a day and is not motivated to quit anytime soon.    The following portions of the patient's history were reviewed and updated as appropriate: allergies, current medications, past family history, past medical history, past social history, past surgical history and problem list.    Past Medical Hx:  History reviewed. No pertinent past medical history.    Past Surgical Hx:  History reviewed. No pertinent surgical history.    Current Meds:    Current Outpatient Medications:   •  loratadine (CLARITIN) 10 MG tablet, Take 10 mg by mouth Daily., Disp: , Rfl:   •  losartan (Cozaar) 50 MG tablet, Take 1 tablet by mouth Daily., Disp: 30 tablet, Rfl: 5  •  simvastatin (Zocor) 10 MG tablet, Take 1 tablet by mouth Every Night., Disp: 30 tablet, Rfl: 5  •  sodium-potassium-magnesium sulfates (Suprep Bowel Prep Kit) 17.5-3.13-1.6 GM/177ML solution oral solution, Please use the instructions given in office, Disp: 354 mL, Rfl: 0  •  triamcinolone (KENALOG) 0.1 % cream, APPLY A THIN FILM TO LEFT LOWER LEG TWICE DAILY AS NEEDED FOR UP TO 2 WEEKS -- STOP FOR 2 WEEKS -- MAY REPEAT CYCLE AS NEEDED FOR FLARES -- AVOID FACE, ARMPITS, AND GROIN AREAS, Disp: , Rfl:   •  triamcinolone (KENALOG) 0.1 % cream, a thin film to left lower leg  twice daily as needed for up two weeks.  Stop for two weeks. May repeat cycle as needed for flares. Avoid face, armpits and groin areas., Disp: , Rfl:     Allergies:  No Known Allergies    Family Hx:  History reviewed.  "No pertinent family history.     Social History:  Social History     Socioeconomic History   • Marital status:    Tobacco Use   • Smoking status: Never   • Smokeless tobacco: Never   Vaping Use   • Vaping Use: Never used   Substance and Sexual Activity   • Alcohol use: Yes     Comment: sometimes   • Drug use: Never   • Sexual activity: Defer       Review of Systems  Review of Systems   Constitutional: Negative for activity change, appetite change and fatigue.   Respiratory: Negative for cough, chest tightness, shortness of breath and wheezing.    Cardiovascular: Negative for chest pain, palpitations and leg swelling.   Gastrointestinal: Negative for abdominal pain, diarrhea, nausea and vomiting.   Genitourinary: Negative for difficulty urinating, hematuria and urgency.   Musculoskeletal: Positive for arthralgias. Negative for myalgias.   Skin: Negative for color change and rash.   Neurological: Negative for dizziness, seizures and light-headedness.   Psychiatric/Behavioral: Positive for sleep disturbance. Negative for behavioral problems and decreased concentration. The patient is not nervous/anxious.        Objective:     /78   Pulse 65   Ht 170.2 cm (67\")   Wt 89.7 kg (197 lb 12.8 oz)   SpO2 97%   BMI 30.98 kg/m²   Physical Exam  Vitals and nursing note reviewed.   Constitutional:       General: He is not in acute distress.     Appearance: Normal appearance. He is obese. He is not ill-appearing, toxic-appearing or diaphoretic.   Cardiovascular:      Rate and Rhythm: Normal rate and regular rhythm.      Pulses: Normal pulses.      Heart sounds: Normal heart sounds. No murmur heard.  Pulmonary:      Effort: Pulmonary effort is normal. No respiratory distress.      Breath sounds: Normal breath sounds. No wheezing.   Abdominal:      General: Bowel sounds are normal.      Palpations: Abdomen is soft.      Tenderness: There is no abdominal tenderness. There is no guarding.   Musculoskeletal:      Right " lower leg: No edema.      Left lower leg: No edema.   Skin:     General: Skin is warm and dry.      Capillary Refill: Capillary refill takes less than 2 seconds.   Neurological:      General: No focal deficit present.      Mental Status: He is alert and oriented to person, place, and time.   Psychiatric:         Mood and Affect: Mood normal.         Behavior: Behavior normal.          Assessment/Plan:   Laz Castillo is a 43 y.o. male who presents for HTN follow up and lab results. Laboratory findings were discussed with the patient. Strongly advised to continue losartan and zocor. Encouraged to follow up with sleep study and has colonoscopy scheduled end of this month. No new complaints today and feeling good overall. Counseled on diet and exercise.     Diagnoses and all orders for this visit:    1. Essential hypertension (Primary)      · Rx changes: none  · Patient Education: diet and exercise. DASH diet   · Compliance at present is estimated to be good.   · Efforts to improve compliance (if necessary) will be directed at increased exercise.    Depression screening: Up to date; last screen      Follow-up:     Return in about 6 months (around 8/13/2023) for Recheck.    Preventative:  Health Maintenance   Topic Date Due   • COLORECTAL CANCER SCREENING  Never done   • COVID-19 Vaccine (3 - Booster for Pfizer series) 11/10/2021   • INFLUENZA VACCINE  Never done   • ANNUAL PHYSICAL  02/25/2023   • LIPID PANEL  01/12/2024   • TDAP/TD VACCINES (2 - Td or Tdap) 12/10/2028   • HEPATITIS C SCREENING  Completed   • Pneumococcal Vaccine 0-64  Aged Out           Alcohol use:  reports current alcohol use.  Nicotine status  reports that he has never smoked. He has never used smokeless tobacco.     Goals    None         RISK SCORE: 3        Essie Barraza MD PGY-3  [facility] Family Medicine Residency   This document has been electronically signed by Essie Barraza MD on February 13, 2023 09:15 CST

## 2023-02-15 NOTE — PROGRESS NOTES
I have reviewed the notes, assessments, and/or procedures performed by Essie Barraza MD during office visit. I concur with her/his documentation and assessment and plan for [facility].           This document has been electronically signed by Bhupendra Mckeon MD on February 15, 2023 11:43 CST

## 2023-02-19 NOTE — PROGRESS NOTES
[facility]      Chief Complaint:   Chief Complaint   Patient presents with   • Screening Colonoscopy       Subjective     HPI:   Mr. Castillo is a 43-year-old  male with past medical history of hypertension, hyperlipidemia presented for evaluation for colorectal cancer screening.  His maternal uncle had colon cancer.  He denied abdominal pain, nausea, vomiting, diarrhea, constipation, rectal bleeding or weight loss.    Past Medical History:   No past medical history on file.  Arthralgias, hypertension, hyperlipidemia  Past Surgical History:  No past surgical history on file.  None  Family History:  No family history on file.  His maternal uncle had colon cancer.  Social History:   reports that he has never smoked. He has never used smokeless tobacco. He reports current alcohol use. He reports that he does not use drugs.    Medications:   Prior to Admission medications    Medication Sig Start Date End Date Taking? Authorizing Provider   losartan (Cozaar) 50 MG tablet Take 1 tablet by mouth Daily. 1/12/23  Yes Essie Barraza MD   simvastatin (Zocor) 10 MG tablet Take 1 tablet by mouth Every Night. 1/12/23  Yes Essie Barraza MD   triamcinolone (KENALOG) 0.1 % cream a thin film to left lower leg  twice daily as needed for up two weeks.  Stop for two weeks. May repeat cycle as needed for flares. Avoid face, armpits and groin areas. 1/18/23 1/19/24 Yes Maira Graham MD   loratadine (CLARITIN) 10 MG tablet Take 10 mg by mouth Daily. 3/29/21   Maira Graham MD   sodium-potassium-magnesium sulfates (Suprep Bowel Prep Kit) 17.5-3.13-1.6 GM/177ML solution oral solution Please use the instructions given in office 2/7/23   Laurie Jimenez MD   triamcinolone (KENALOG) 0.1 % cream APPLY A THIN FILM TO LEFT LOWER LEG TWICE DAILY AS NEEDED FOR UP TO 2 WEEKS -- STOP FOR 2 WEEKS -- MAY REPEAT CYCLE AS NEEDED FOR FLARES -- AVOID FACE, ARMPITS, AND GROIN AREAS 1/18/23   Gladys  "Historical, MD       Allergies:  Patient has no known allergies.    ROS:    Review of Systems   Constitutional: Negative for chills, fatigue, fever and unexpected weight change.   HENT: Negative for congestion, ear discharge, hearing loss, nosebleeds and sore throat.    Eyes: Negative for pain, discharge and redness.   Respiratory: Negative for cough, chest tightness, shortness of breath and wheezing.    Cardiovascular: Negative for chest pain and palpitations.   Gastrointestinal: Negative for abdominal distention, abdominal pain, blood in stool, constipation, diarrhea, nausea and vomiting.   Endocrine: Negative for cold intolerance, polydipsia, polyphagia and polyuria.   Genitourinary: Negative for dysuria, flank pain, frequency, hematuria and urgency.   Musculoskeletal: Positive for arthralgias. Negative for back pain, joint swelling and myalgias.   Skin: Negative for color change, pallor and rash.   Neurological: Negative for tremors, seizures, syncope, weakness and headaches.   Hematological: Negative for adenopathy. Does not bruise/bleed easily.   Psychiatric/Behavioral: Negative for behavioral problems, confusion, dysphoric mood, hallucinations and suicidal ideas. The patient is not nervous/anxious.      Objective     /81   Pulse 70   Ht 170.2 cm (67\")   Wt 89.5 kg (197 lb 6.4 oz)   BMI 30.92 kg/m²     Physical Exam  Constitutional:       Appearance: He is well-developed.   HENT:      Head: Normocephalic and atraumatic.   Eyes:      Conjunctiva/sclera: Conjunctivae normal.      Pupils: Pupils are equal, round, and reactive to light.   Neck:      Thyroid: No thyromegaly.   Cardiovascular:      Rate and Rhythm: Normal rate and regular rhythm.      Heart sounds: Normal heart sounds. No murmur heard.  Pulmonary:      Effort: Pulmonary effort is normal.      Breath sounds: Normal breath sounds. No wheezing.   Abdominal:      General: Bowel sounds are normal. There is no distension.      Palpations: " Abdomen is soft. There is no mass.      Tenderness: There is no abdominal tenderness.      Hernia: No hernia is present.   Genitourinary:     Comments: No lesions noted  Musculoskeletal:         General: No tenderness. Normal range of motion.      Cervical back: Normal range of motion and neck supple.   Lymphadenopathy:      Cervical: No cervical adenopathy.   Skin:     General: Skin is warm and dry.      Findings: No rash.   Neurological:      Mental Status: He is alert and oriented to person, place, and time.      Cranial Nerves: No cranial nerve deficit.   Psychiatric:         Thought Content: Thought content normal.        Extremities: No edema, cyanosis or clubbing.    Assessment & Plan    1.  Colorectal cancer screening, high risk due to family history of colon cancer.  Schedule colonoscopy.  2.  Obesity, recommend exercise and diet control.  3.  Alcohol usage, recommend cessation.  Diagnoses and all orders for this visit:    1. Encounter for screening for malignant neoplasm of colon (Primary)  -     Case Request; Standing  -     sodium chloride 0.9 % infusion 40 mL  -     dextrose 5 % and sodium chloride 0.45 % infusion  -     Case Request    2. Family history of colon cancer  -     Case Request; Standing  -     sodium chloride 0.9 % infusion 40 mL  -     dextrose 5 % and sodium chloride 0.45 % infusion  -     Case Request    Other orders  -     sodium-potassium-magnesium sulfates (Suprep Bowel Prep Kit) 17.5-3.13-1.6 GM/177ML solution oral solution; Please use the instructions given in office  Dispense: 354 mL; Refill: 0  -     Follow Anesthesia Guidelines / Protocol; Future  -     Obtain Informed Consent; Future  -     Implement Anesthesia Orders Day of Procedure; Standing  -     Obtain Informed Consent; Standing  -     POC Glucose Once; Standing  -     Insert Peripheral IV; Standing        COLONOSCOPY (N/A)     Diagnosis Plan   1. Encounter for screening for malignant neoplasm of colon  Case Request     sodium chloride 0.9 % infusion 40 mL    dextrose 5 % and sodium chloride 0.45 % infusion    Case Request      2. Family history of colon cancer  Case Request    sodium chloride 0.9 % infusion 40 mL    dextrose 5 % and sodium chloride 0.45 % infusion    Case Request          Anticipated Surgical Procedure:  Orders Placed This Encounter   Procedures   • Obtain Informed Consent     Standing Status:   Future     Order Specific Question:   Informed Consent Given For     Answer:   colonoscopy       The risks, benefits, and alternatives of this procedure have been discussed with the patient or the responsible party- the patient understands and agrees to proceed.            This document has been electronically signed by aLurie Jimenez MD on February 19, 2023 14:20 CST

## 2023-02-27 ENCOUNTER — OFFICE VISIT (OUTPATIENT)
Dept: SLEEP MEDICINE | Facility: HOSPITAL | Age: 43
End: 2023-02-27
Payer: COMMERCIAL

## 2023-02-27 VITALS
HEIGHT: 67 IN | DIASTOLIC BLOOD PRESSURE: 79 MMHG | HEART RATE: 75 BPM | SYSTOLIC BLOOD PRESSURE: 133 MMHG | WEIGHT: 198 LBS | BODY MASS INDEX: 31.08 KG/M2 | OXYGEN SATURATION: 96 %

## 2023-02-27 DIAGNOSIS — R06.81 WITNESSED EPISODE OF APNEA: ICD-10-CM

## 2023-02-27 DIAGNOSIS — G47.19 EXCESSIVE DAYTIME SLEEPINESS: ICD-10-CM

## 2023-02-27 DIAGNOSIS — R51.9 MORNING HEADACHE: ICD-10-CM

## 2023-02-27 DIAGNOSIS — G47.00 FREQUENT NOCTURNAL AWAKENING: ICD-10-CM

## 2023-02-27 DIAGNOSIS — R06.83 SNORING: Primary | ICD-10-CM

## 2023-02-27 PROCEDURE — 99203 OFFICE O/P NEW LOW 30 MIN: CPT | Performed by: NURSE PRACTITIONER

## 2023-02-27 NOTE — PROGRESS NOTES
New Patient Sleep Medicine Consultation    Encounter Date: 2/27/2023         Patient's Primary Care Provider: Essie Barraza MD  Referring Provider: Essie Barraza MD  Reason for consultation/chief complaint: loud disruptive snoring, awakening gasping for breath, witnessed apneas, excessive daytime sleepiness and unrefreshing sleep    Saw Washington is a 43 y.o. male who admits to snoring, unrestful sleep, gasping during sleep, excessive daytime sleepiness, morning headaches, stopping breathing during sleep, frequent unexplained arousals, memory loss and difficulty staying asleep.    He denies cataplexy, sleep paralysis, or hypnagogic hallucinations. His bedtime is ~ 0700. He  falls asleep after 1 minutes, and is up 2-3 times per night. He wakes up ~ 1400. He endorses 5-6 hours of sleep. He drinks 1 cups of coffee, 0 teas, and 3-4 sodas per day. He drinks 20-30 alcoholic beverages per week. He is a current smoker. He does not take sedatives or hypnotics. He has no sleepiness with driving. He occasionally naps when off work.    [place] - 7  [place] Sleepiness Scale  Sitting and reading: Slight chance of dozing  Watching TV: Would never doze  Sitting, inactive in a public place (e.g. a theatre or a meeting): Would never doze  As a passenger in a car for an hour without a break: Slight chance of dozing  Lying down to rest in the afternoon when circumstances permit: High chance of dozing  Sitting and talking to someone: Would never doze  Sitting quietly after a lunch without alcohol: Moderate chance of dozing  In a car, while stopped for a few minutes in traffic: Would never doze  Total score: 7    Prior Sleep Testing: None    Past Medical History:   Diagnosis Date   • Hyperlipidemia    • Hypertension      Social History     Socioeconomic History   • Marital status:    Tobacco Use   • Smoking status: Every Day     Packs/day: 1.00     Years: 25.00     Pack years: 25.00     Types: Cigarettes     Passive exposure:  "Current   • Smokeless tobacco: Never   Vaping Use   • Vaping Use: Never used   Substance and Sexual Activity   • Alcohol use: Yes     Comment: sometimes   • Drug use: Never   • Sexual activity: Defer     History reviewed. No pertinent family history.     Prior T&A, UPPP, maxillofacial, or bariatric surgery: None  Family history of sleep disorders: None  Other family history + for: None  Occupation: Maintenance  Marital status:   Children: 1  Smoking history: smoked 1 ppds from age 23 until present      Review of Systems:  Constitutional: positive for fatigue  Ears, nose, mouth, throat, and face: positive for snoring  Respiratory: negative  Cardiovascular: negative  Gastrointestinal: negative  Genitourinary:negative  Musculoskeletal:negative  Neurological: negative  Behavioral/Psych: positive for fatigue and sleep disturbance  Patient advised to discuss any positive ROS with PCP.      Vitals:    02/27/23 1006   BP: 133/79   Pulse: 75   SpO2: 96%           02/27/23  1006   Weight: 89.8 kg (198 lb)       Body mass index is 31 kg/m². BMI is >= 30 and <35. (Class 1 Obesity). The following options were offered after discussion;: referral to primary care    Tobacco Use: High Risk   • Smoking Tobacco Use: Every Day   • Smokeless Tobacco Use: Never   • Passive Exposure: Current         Physical Exam:        General: Alert. Cooperative. Well developed. No acute distress.   Head/Neck:  Normocephalic. Symmetrical. Atraumatic.     Neck circumference: 16\"             Eyes: Sclera clear. No icterus. PERRLA. Normal EOM.             Ears: No deformities. Normal hearing.             Nose: No septal deviation. No drainage.          Throat: No oral lesions. No thrush. Moist mucous membranes. Trachea midline.    Tongue is normal     Dentition is fair       Pharynx: Posterior pharyngeal pillars are narrow    Mallampati score of III (soft and hard palate and base of uvula visible)    Pharynx is nonerythematous, with both tonsils " mildly enlarged   Chest Wall:  Normal shape. Symmetric expansion with respiration. No tenderness.          Lungs:  Clear to auscultation bilaterally. No wheezes. No rhonchi. No rales. Respirations regular, even and unlabored.            Heart:  Regular rhythm and normal rate. Normal S1 and S2. No murmur.     Abdomen:  Soft, non-tender and non-distended. Normal bowel sounds. No masses.  Extremities:  Moves all extremities well. No edema.           Pulses: Pulses palpable and equal bilaterally.               Skin: Dry. Intact. No bleeding. No rash.           Neuro: Moves all 4 extremities and cranial nerves grossly intact.  Psychiatric: Normal mood and affect.      Current Outpatient Medications:   •  losartan (Cozaar) 50 MG tablet, Take 1 tablet by mouth Daily., Disp: 30 tablet, Rfl: 5  •  simvastatin (Zocor) 10 MG tablet, Take 1 tablet by mouth Every Night., Disp: 30 tablet, Rfl: 5  •  loratadine (CLARITIN) 10 MG tablet, Take 10 mg by mouth Daily., Disp: , Rfl:   •  sodium-potassium-magnesium sulfates (Suprep Bowel Prep Kit) 17.5-3.13-1.6 GM/177ML solution oral solution, Please use the instructions given in office, Disp: 354 mL, Rfl: 0  •  triamcinolone (KENALOG) 0.1 % cream, APPLY A THIN FILM TO LEFT LOWER LEG TWICE DAILY AS NEEDED FOR UP TO 2 WEEKS -- STOP FOR 2 WEEKS -- MAY REPEAT CYCLE AS NEEDED FOR FLARES -- AVOID FACE, ARMPITS, AND GROIN AREAS, Disp: , Rfl:   •  triamcinolone (KENALOG) 0.1 % cream, a thin film to left lower leg  twice daily as needed for up two weeks.  Stop for two weeks. May repeat cycle as needed for flares. Avoid face, armpits and groin areas., Disp: , Rfl:     WBC   Date Value Ref Range Status   01/12/2023 7.73 3.40 - 10.80 10*3/mm3 Final   03/15/2019 6.3 4.8 - 10.8 10*9/L Final     RBC   Date Value Ref Range Status   01/12/2023 4.82 4.14 - 5.80 10*6/mm3 Final   03/15/2019 5.00 4.70 - 6.10 10*12/L Final     Hemoglobin   Date Value Ref Range Status   01/12/2023 14.8 13.0 - 17.7 g/dL Final    03/15/2019 15.6 14.0 - 18.0 g/dL Final     Hematocrit   Date Value Ref Range Status   01/12/2023 45.8 37.5 - 51.0 % Final   03/15/2019 47.8 42 - 54 % Final     MCV   Date Value Ref Range Status   01/12/2023 95.0 79.0 - 97.0 fL Final   03/15/2019 95.6 80.0 - 100.0 fL Final     MCH   Date Value Ref Range Status   01/12/2023 30.7 26.6 - 33.0 pg Final   03/15/2019 31.2 26.0 - 34.0 pg Final     MCHC   Date Value Ref Range Status   01/12/2023 32.3 31.5 - 35.7 g/dL Final   03/15/2019 32.6 31 - 36 g/dL Final     RDW   Date Value Ref Range Status   01/12/2023 12.8 12.3 - 15.4 % Final   03/15/2019 13.1 11.5 - 14.5 % Final     RDW-SD   Date Value Ref Range Status   01/12/2023 44.1 37.0 - 54.0 fl Final     MPV   Date Value Ref Range Status   01/12/2023 9.6 6.0 - 12.0 fL Final   03/15/2019 7.5 7.4 - 10.4 fL Final     Platelets   Date Value Ref Range Status   01/12/2023 306 140 - 450 10*3/mm3 Final   03/15/2019 310 150 - 450 10*9/L Final     Neutrophil Rel %   Date Value Ref Range Status   03/15/2019 52.0 35 - 80 % Final     Neutrophil %   Date Value Ref Range Status   01/12/2023 49.6 42.7 - 76.0 % Final     Lymphocyte Rel %   Date Value Ref Range Status   03/15/2019 35.6 18 - 44 % Final     Lymphocyte %   Date Value Ref Range Status   01/12/2023 33.5 19.6 - 45.3 % Final     Monocyte Rel %   Date Value Ref Range Status   03/15/2019 8.5 0 - 10 % Final     Monocyte %   Date Value Ref Range Status   01/12/2023 9.4 5.0 - 12.0 % Final     Eosinophil %   Date Value Ref Range Status   01/12/2023 5.8 0.3 - 6.2 % Final   03/15/2019 2.5 0 - 3 % Final     Basophil Rel %   Date Value Ref Range Status   03/15/2019 1.4 (H) 0 - 1 % Final     Basophil %   Date Value Ref Range Status   01/12/2023 1.4 0.0 - 1.5 % Final     Immature Grans %   Date Value Ref Range Status   01/12/2023 0.3 0.0 - 0.5 % Final     Neutrophils, Absolute   Date Value Ref Range Status   01/12/2023 3.83 1.70 - 7.00 10*3/mm3 Final     Lymphocytes Absolute   Date Value Ref  Range Status   03/15/2019 2.2 0 - 4 10*9/L Final     Lymphocytes, Absolute   Date Value Ref Range Status   01/12/2023 2.59 0.70 - 3.10 10*3/mm3 Final     Monocytes Absolute   Date Value Ref Range Status   03/15/2019 0.5 0 - 0 10*9/L Final     Monocytes, Absolute   Date Value Ref Range Status   01/12/2023 0.73 0.10 - 0.90 10*3/mm3 Final     Eosinophil Abs   Date Value Ref Range Status   03/15/2019 0.2 0 - 0 10*9/L Final     Eosinophils, Absolute   Date Value Ref Range Status   01/12/2023 0.45 (H) 0.00 - 0.40 10*3/mm3 Final     Basophils Absolute   Date Value Ref Range Status   03/15/2019 0.1 0 - 0 10*9/L Final     Basophils, Absolute   Date Value Ref Range Status   01/12/2023 0.11 0.00 - 0.20 10*3/mm3 Final     Immature Grans, Absolute   Date Value Ref Range Status   01/12/2023 0.02 0.00 - 0.05 10*3/mm3 Final     nRBC   Date Value Ref Range Status   01/12/2023 0.0 0.0 - 0.2 /100 WBC Final     Lab Results   Component Value Date    GLUCOSE 81 01/12/2023    BUN 14 01/12/2023    CREATININE 0.74 (L) 01/12/2023    EGFR 113.9 01/12/2023    BCR 18.9 01/12/2023    K 3.9 01/12/2023    CO2 23.5 01/12/2023    CALCIUM 9.2 01/12/2023    ALBUMIN 4.5 01/12/2023    AST 35 01/12/2023    ALT 38 01/12/2023       Contraindications to home sleep test: none    ASSESSMENT:  1. Excessive daytime sleepiness, presumed obstructive sleep apnea - New (to me), additional work-up planned (4)  1. Check home sleep study (regular)  2. Follow up for results  3. Pertinent labs were reviewed as listed above  2. Snoring, presumed obstructive sleep apnea - New (to me), additional work-up planned (4)  1. As above  3. Witnessed episodes of apnea - New (to me), additional work-up planned (4)  1. As above      I spent 30 minutes caring for Saw on this date of service. This time includes time spent by me in the following activities: preparing for the visit, reviewing tests, obtaining and/or reviewing a separately obtained history, performing a medically  appropriate examination and/or evaluation , counseling and educating the patient/family/caregiver, ordering medications, tests, or procedures, documenting information in the medical record and care coordination; discussing Further testing    RTC 2 weeks after testing. Patient agrees to return sooner if changes in symptoms.           This document has been electronically signed by ALEXANDR Nails on February 27, 2023 10:15 CST          CC: Essie Barraza MD [place], [place], MD

## 2023-02-28 ENCOUNTER — ANESTHESIA EVENT (OUTPATIENT)
Dept: GASTROENTEROLOGY | Facility: HOSPITAL | Age: 43
End: 2023-02-28
Payer: COMMERCIAL

## 2023-02-28 ENCOUNTER — HOSPITAL ENCOUNTER (OUTPATIENT)
Facility: HOSPITAL | Age: 43
Setting detail: HOSPITAL OUTPATIENT SURGERY
Discharge: HOME | End: 2023-02-28
Attending: INTERNAL MEDICINE | Admitting: INTERNAL MEDICINE
Payer: COMMERCIAL

## 2023-02-28 ENCOUNTER — ANESTHESIA (OUTPATIENT)
Dept: GASTROENTEROLOGY | Facility: HOSPITAL | Age: 43
End: 2023-02-28
Payer: COMMERCIAL

## 2023-02-28 VITALS
TEMPERATURE: 97.2 F | DIASTOLIC BLOOD PRESSURE: 69 MMHG | HEART RATE: 95 BPM | SYSTOLIC BLOOD PRESSURE: 124 MMHG | OXYGEN SATURATION: 96 % | RESPIRATION RATE: 16 BRPM | WEIGHT: 198 LBS | BODY MASS INDEX: 31.08 KG/M2 | HEIGHT: 67 IN

## 2023-02-28 DIAGNOSIS — Z80.0 FAMILY HISTORY OF COLON CANCER: ICD-10-CM

## 2023-02-28 DIAGNOSIS — Z12.11 ENCOUNTER FOR SCREENING FOR MALIGNANT NEOPLASM OF COLON: ICD-10-CM

## 2023-02-28 PROCEDURE — 45380 COLONOSCOPY AND BIOPSY: CPT | Performed by: INTERNAL MEDICINE

## 2023-02-28 PROCEDURE — 88305 TISSUE EXAM BY PATHOLOGIST: CPT

## 2023-02-28 PROCEDURE — 25010000002 PROPOFOL 10 MG/ML EMULSION

## 2023-02-28 RX ORDER — DEXTROSE AND SODIUM CHLORIDE 5; .45 G/100ML; G/100ML
30 INJECTION, SOLUTION INTRAVENOUS CONTINUOUS PRN
Status: DISCONTINUED | OUTPATIENT
Start: 2023-02-28 | End: 2023-02-28 | Stop reason: HOSPADM

## 2023-02-28 RX ORDER — PROPOFOL 10 MG/ML
VIAL (ML) INTRAVENOUS AS NEEDED
Status: DISCONTINUED | OUTPATIENT
Start: 2023-02-28 | End: 2023-02-28 | Stop reason: SURG

## 2023-02-28 RX ORDER — LIDOCAINE HYDROCHLORIDE 20 MG/ML
INJECTION, SOLUTION INTRAVENOUS AS NEEDED
Status: DISCONTINUED | OUTPATIENT
Start: 2023-02-28 | End: 2023-02-28 | Stop reason: SURG

## 2023-02-28 RX ADMIN — PROPOFOL 100 MG: 10 INJECTION, EMULSION INTRAVENOUS at 13:37

## 2023-02-28 RX ADMIN — LIDOCAINE HYDROCHLORIDE 50 MG: 20 INJECTION, SOLUTION INTRAVENOUS at 13:36

## 2023-02-28 RX ADMIN — DEXTROSE AND SODIUM CHLORIDE 30 ML/HR: 5; 450 INJECTION, SOLUTION INTRAVENOUS at 13:01

## 2023-02-28 RX ADMIN — PROPOFOL 370 MG: 10 INJECTION, EMULSION INTRAVENOUS at 13:38

## 2023-02-28 NOTE — ANESTHESIA POSTPROCEDURE EVALUATION
Patient: Laz Castillo    Procedure Summary     Date: 02/28/23 Room / Location: [facility] ENDOSCOPY 2 / [facility] ENDOSCOPY    Anesthesia Start: 1325 Anesthesia Stop: 1351    Procedure: COLONOSCOPY Diagnosis:       Encounter for screening for malignant neoplasm of colon      Family history of colon cancer      (Encounter for screening for malignant neoplasm of colon [Z12.11])      (Family history of colon cancer [Z80.0])    Surgeons: Laurie Jimenez MD Provider: Iesha Harding CRNA    Anesthesia Type: general ASA Status: 3          Anesthesia Type: general    Vitals  No vitals data found for the desired time range.          Post Anesthesia Care and Evaluation    Patient location during evaluation: bedside  Patient participation: complete - patient cannot participate  Level of consciousness: sleepy but conscious  Pain score: 0  Pain management: adequate    Airway patency: patent  Anesthetic complications: No anesthetic complications  PONV Status: none  Cardiovascular status: acceptable  Respiratory status: acceptable  Hydration status: acceptable  No anesthesia care post op

## 2023-02-28 NOTE — ANESTHESIA PREPROCEDURE EVALUATION
Anesthesia Evaluation     Patient summary reviewed and Nursing notes reviewed   NPO Solid Status: > 8 hours  NPO Liquid Status: > 2 hours           Airway   Mallampati: II  TM distance: <3 FB  Neck ROM: full  No difficulty expected  Dental    (+) poor dentition    Pulmonary - normal exam   (+) a smoker Current Abstained day of surgery,   Cardiovascular - normal exam    Rhythm: regular  Rate: normal    (+) hypertension well controlled less than 2 medications, hyperlipidemia,       Neuro/Psych- negative ROS  GI/Hepatic/Renal/Endo    (+) obesity,       Musculoskeletal (-) negative ROS    Abdominal  - normal exam   Substance History - negative use     OB/GYN negative ob/gyn ROS         Other - negative ROS                     Anesthesia Plan    ASA 3     general   total IV anesthesia  intravenous induction     Anesthetic plan, risks, benefits, and alternatives have been provided, discussed and informed consent has been obtained with: patient.  Pre-procedure education provided  Plan discussed with CRNA and resident.        CODE STATUS:

## 2023-03-02 LAB — REF LAB TEST METHOD: NORMAL

## 2023-03-07 ENCOUNTER — OFFICE VISIT (OUTPATIENT)
Dept: GASTROENTEROLOGY | Facility: CLINIC | Age: 43
End: 2023-03-07
Payer: COMMERCIAL

## 2023-03-07 VITALS
WEIGHT: 194 LBS | HEIGHT: 67 IN | BODY MASS INDEX: 30.45 KG/M2 | DIASTOLIC BLOOD PRESSURE: 80 MMHG | SYSTOLIC BLOOD PRESSURE: 129 MMHG

## 2023-03-07 DIAGNOSIS — Z80.0 FAMILY HISTORY OF COLON CANCER: Primary | ICD-10-CM

## 2023-03-07 PROCEDURE — 99213 OFFICE O/P EST LOW 20 MIN: CPT | Performed by: INTERNAL MEDICINE

## 2023-03-08 ENCOUNTER — HOSPITAL ENCOUNTER (OUTPATIENT)
Dept: SLEEP MEDICINE | Facility: HOSPITAL | Age: 43
Discharge: HOME | End: 2023-03-08
Admitting: NURSE PRACTITIONER
Payer: COMMERCIAL

## 2023-03-08 DIAGNOSIS — G47.19 EXCESSIVE DAYTIME SLEEPINESS: ICD-10-CM

## 2023-03-08 DIAGNOSIS — G47.00 FREQUENT NOCTURNAL AWAKENING: ICD-10-CM

## 2023-03-08 DIAGNOSIS — R06.83 SNORING: ICD-10-CM

## 2023-03-08 DIAGNOSIS — R51.9 MORNING HEADACHE: ICD-10-CM

## 2023-03-08 DIAGNOSIS — R06.81 WITNESSED EPISODE OF APNEA: ICD-10-CM

## 2023-03-08 PROCEDURE — 95800 SLP STDY UNATTENDED: CPT

## 2023-03-22 ENCOUNTER — OFFICE VISIT (OUTPATIENT)
Dept: SLEEP MEDICINE | Facility: HOSPITAL | Age: 43
End: 2023-03-22
Payer: COMMERCIAL

## 2023-03-22 VITALS
HEART RATE: 77 BPM | DIASTOLIC BLOOD PRESSURE: 85 MMHG | WEIGHT: 194 LBS | HEIGHT: 67 IN | SYSTOLIC BLOOD PRESSURE: 139 MMHG | OXYGEN SATURATION: 97 % | BODY MASS INDEX: 30.45 KG/M2

## 2023-03-22 DIAGNOSIS — G47.33 OBSTRUCTIVE SLEEP APNEA, ADULT: Primary | ICD-10-CM

## 2023-03-22 PROCEDURE — 99213 OFFICE O/P EST LOW 20 MIN: CPT | Performed by: NURSE PRACTITIONER

## 2023-03-22 NOTE — PROGRESS NOTES
Sleep Clinic Follow Up - Sleep Study Results    Date: 3/22/2023  Primary Care Provider: Essie Barraza MD  Chief complaint/Reason for visit: follow up sleep testing    Last office visit: 02/27/2023    HPI:  The patient is a 43 y.o. male who underwent home sleep testing on 03/08/2023.  The AHI/CAMILA was 6.5, the RDI was 15.9. Pulse range of  bpm. O2 sonja of 90% with no sustained hypoxia and 0 minutes total sleep time SpO2 </=88%. Total sleep time scored was only 3 hours 24 minutes, but this is typical for the patient's reported sleep pattern.       INTERVAL MEDICAL HISTORY: No change since last office visit in regard to the patient's bedtime routine, medications, or diagnosis.      Review of Systems:  Denies chest pain, shortness of breath, leg swelling, cough, fever, chills, abdominal pain, N/V/D.    MEDICATIONS:   Current Outpatient Medications:   •  loratadine (CLARITIN) 10 MG tablet, Take 1 tablet by mouth Daily., Disp: , Rfl:   •  losartan (Cozaar) 50 MG tablet, Take 1 tablet by mouth Daily., Disp: 30 tablet, Rfl: 5  •  simvastatin (Zocor) 10 MG tablet, Take 1 tablet by mouth Every Night., Disp: 30 tablet, Rfl: 5  •  triamcinolone (KENALOG) 0.1 % cream, APPLY A THIN FILM TO LEFT LOWER LEG TWICE DAILY AS NEEDED FOR UP TO 2 WEEKS -- STOP FOR 2 WEEKS -- MAY REPEAT CYCLE AS NEEDED FOR FLARES -- AVOID FACE, ARMPITS, AND GROIN AREAS, Disp: , Rfl:   •  triamcinolone (KENALOG) 0.1 % cream, a thin film to left lower leg  twice daily as needed for up two weeks.  Stop for two weeks. May repeat cycle as needed for flares. Avoid face, armpits and groin areas., Disp: , Rfl:     PHYSICAL EXAM:    Vitals:    03/22/23 0807   BP: 139/85   Pulse: 77   SpO2: 97%         03/22/23 0807   Weight: 88 kg (194 lb)     Body mass index is 30.38 kg/m².  BMI is >= 30 and <35. (Class 1 Obesity). The following options were offered after discussion;: referral to primary care    Gen:                No distress, conversant, pleasant, appears  stated age, alert, oriented  Eyes:               Anicteric sclera, moist conjunctiva, no lid lag                           PERRL, EOMI   Heent:             NC/AT                          Oropharynx clear                          Normal hearing  Lungs:             Normal effort, non-labored breathing        CV:                  Normal S1/S2                          No lower extremity edema  ABD:               Soft, rounded, non-distended                Psych:             Appropriate affect  Neuro:             CN 2-12 appear intact      WBC   Date Value Ref Range Status   01/12/2023 7.73 3.40 - 10.80 10*3/mm3 Final   03/15/2019 6.3 4.8 - 10.8 10*9/L Final     RBC   Date Value Ref Range Status   01/12/2023 4.82 4.14 - 5.80 10*6/mm3 Final   03/15/2019 5.00 4.70 - 6.10 10*12/L Final     Hemoglobin   Date Value Ref Range Status   01/12/2023 14.8 13.0 - 17.7 g/dL Final   03/15/2019 15.6 14.0 - 18.0 g/dL Final     Hematocrit   Date Value Ref Range Status   01/12/2023 45.8 37.5 - 51.0 % Final   03/15/2019 47.8 42 - 54 % Final     MCV   Date Value Ref Range Status   01/12/2023 95.0 79.0 - 97.0 fL Final   03/15/2019 95.6 80.0 - 100.0 fL Final     MCH   Date Value Ref Range Status   01/12/2023 30.7 26.6 - 33.0 pg Final   03/15/2019 31.2 26.0 - 34.0 pg Final     MCHC   Date Value Ref Range Status   01/12/2023 32.3 31.5 - 35.7 g/dL Final   03/15/2019 32.6 31 - 36 g/dL Final     RDW   Date Value Ref Range Status   01/12/2023 12.8 12.3 - 15.4 % Final   03/15/2019 13.1 11.5 - 14.5 % Final     RDW-SD   Date Value Ref Range Status   01/12/2023 44.1 37.0 - 54.0 fl Final     MPV   Date Value Ref Range Status   01/12/2023 9.6 6.0 - 12.0 fL Final   03/15/2019 7.5 7.4 - 10.4 fL Final     Platelets   Date Value Ref Range Status   01/12/2023 306 140 - 450 10*3/mm3 Final   03/15/2019 310 150 - 450 10*9/L Final     Neutrophil Rel %   Date Value Ref Range Status   03/15/2019 52.0 35 - 80 % Final     Neutrophil %   Date Value Ref Range Status    01/12/2023 49.6 42.7 - 76.0 % Final     Lymphocyte Rel %   Date Value Ref Range Status   03/15/2019 35.6 18 - 44 % Final     Lymphocyte %   Date Value Ref Range Status   01/12/2023 33.5 19.6 - 45.3 % Final     Monocyte Rel %   Date Value Ref Range Status   03/15/2019 8.5 0 - 10 % Final     Monocyte %   Date Value Ref Range Status   01/12/2023 9.4 5.0 - 12.0 % Final     Eosinophil %   Date Value Ref Range Status   01/12/2023 5.8 0.3 - 6.2 % Final   03/15/2019 2.5 0 - 3 % Final     Basophil Rel %   Date Value Ref Range Status   03/15/2019 1.4 (H) 0 - 1 % Final     Basophil %   Date Value Ref Range Status   01/12/2023 1.4 0.0 - 1.5 % Final     Immature Grans %   Date Value Ref Range Status   01/12/2023 0.3 0.0 - 0.5 % Final     Neutrophils, Absolute   Date Value Ref Range Status   01/12/2023 3.83 1.70 - 7.00 10*3/mm3 Final     Lymphocytes Absolute   Date Value Ref Range Status   03/15/2019 2.2 0 - 4 10*9/L Final     Lymphocytes, Absolute   Date Value Ref Range Status   01/12/2023 2.59 0.70 - 3.10 10*3/mm3 Final     Monocytes Absolute   Date Value Ref Range Status   03/15/2019 0.5 0 - 0 10*9/L Final     Monocytes, Absolute   Date Value Ref Range Status   01/12/2023 0.73 0.10 - 0.90 10*3/mm3 Final     Eosinophil Abs   Date Value Ref Range Status   03/15/2019 0.2 0 - 0 10*9/L Final     Eosinophils, Absolute   Date Value Ref Range Status   01/12/2023 0.45 (H) 0.00 - 0.40 10*3/mm3 Final     Basophils Absolute   Date Value Ref Range Status   03/15/2019 0.1 0 - 0 10*9/L Final     Basophils, Absolute   Date Value Ref Range Status   01/12/2023 0.11 0.00 - 0.20 10*3/mm3 Final     Immature Grans, Absolute   Date Value Ref Range Status   01/12/2023 0.02 0.00 - 0.05 10*3/mm3 Final     nRBC   Date Value Ref Range Status   01/12/2023 0.0 0.0 - 0.2 /100 WBC Final       Lab Results   Component Value Date    GLUCOSE 81 01/12/2023    BUN 14 01/12/2023    CREATININE 0.74 (L) 01/12/2023    EGFR 113.9 01/12/2023    BCR 18.9 01/12/2023     K 3.9 01/12/2023    CO2 23.5 01/12/2023    CALCIUM 9.2 01/12/2023    ALBUMIN 4.5 01/12/2023    BILITOT 1.2 01/12/2023    AST 35 01/12/2023    ALT 38 01/12/2023         Assessment and Plan:    1. Obstructive sleep apnea - New (to me), no additional work-up planned (3)  1. The sleep study results were discussed in detail with the patient. The risks of untreated sleep apnea were reviewed. Treatment options for sleep apnea were discussed. I counseled patient on sleep hygiene, including regular sleep wake schedule and stimulus control therapy, the importance of weight reduction, and abstaining from smoking and alcohol consumption  2. Proceed with autoCPAP 5-15 cm H2O with mask per patient choice ([facility])  3. Pertinent labs were reviewed as listed above  4. Follow up in 30-90 days with compliance report, or sooner if trouble with PAP adaptation  5. SINDHU/CPAP education provided in AVS      All of the patient's questions were answered. He states understanding and agreement with my assessment and plan as above.     I spent 30 minutes caring for Saw on this date of service. This time includes time spent by me in the following activities: preparing for the visit, reviewing tests, obtaining and/or reviewing a separately obtained history, performing a medically appropriate examination and/or evaluation , counseling and educating the patient/family/caregiver, ordering medications, tests, or procedures, documenting information in the medical record, independently interpreting results and communicating that information with the patient/family/caregiver and care coordination; discussing PAP therapy, PAP compliance, PAP maintenance and Study results    Patient to follow up in 31-90 days with compliance report. Patient agrees to return sooner if changes in symptoms.          This document has been electronically signed by ALEXANDR Nails on March 22, 2023 08:25 CDT            CC: Essie Barraza MD          No ref. provider  found

## 2023-03-26 PROCEDURE — 95800 SLP STDY UNATTENDED: CPT | Performed by: PSYCHIATRY & NEUROLOGY

## 2023-03-27 NOTE — PROGRESS NOTES
Chief Complaint   Patient presents with   • endo f/u      Family HX of colon cancer       Subjective    Saw Washington is a 43 y.o. male.    History of Present Illness  Patient presented to GI clinic for follow-up visit today.  Feels better currently.  No GI complaints at this time.  Colonoscopy was consistent with diverticulosis, adenomatous colon polyp and hemorrhoids.       The following portions of the patient's history were reviewed and updated as appropriate:   Past Medical History:   Diagnosis Date   • Hyperlipidemia    • Hypertension      Past Surgical History:   Procedure Laterality Date   • COLONOSCOPY N/A 2/28/2023    Procedure: COLONOSCOPY;  Surgeon: Laurie Jimenez MD;  Location: [facility] ENDOSCOPY;  Service: Gastroenterology;  Laterality: N/A;     History reviewed. No pertinent family history.    Prior to Admission medications    Medication Sig Start Date End Date Taking? Authorizing Provider   loratadine (CLARITIN) 10 MG tablet Take 1 tablet by mouth Daily. 3/29/21  Yes Maira Graham MD   losartan (Cozaar) 50 MG tablet Take 1 tablet by mouth Daily. 1/12/23  Yes Essie Bararza MD   simvastatin (Zocor) 10 MG tablet Take 1 tablet by mouth Every Night. 1/12/23  Yes Essie Barraza MD   triamcinolone (KENALOG) 0.1 % cream APPLY A THIN FILM TO LEFT LOWER LEG TWICE DAILY AS NEEDED FOR UP TO 2 WEEKS -- STOP FOR 2 WEEKS -- MAY REPEAT CYCLE AS NEEDED FOR FLARES -- AVOID FACE, ARMPITS, AND GROIN AREAS 1/18/23  Yes Maira Graham MD   triamcinolone (KENALOG) 0.1 % cream a thin film to left lower leg  twice daily as needed for up two weeks.  Stop for two weeks. May repeat cycle as needed for flares. Avoid face, armpits and groin areas. 1/18/23 1/19/24 Yes Maira Graham MD     No Known Allergies  Social History     Socioeconomic History   • Marital status: Significant Other   Tobacco Use   • Smoking status: Every Day     Packs/day: 1.00     Years: 25.00     Pack years: 25.00     Types:  "Cigarettes     Passive exposure: Current   • Smokeless tobacco: Never   Vaping Use   • Vaping Use: Never used   Substance and Sexual Activity   • Alcohol use: Yes     Comment: sometimes   • Drug use: Never   • Sexual activity: Defer       Review of Systems  Review of Systems   Constitutional: Negative for chills, fatigue, fever and unexpected weight change.   HENT: Negative for congestion, ear discharge, hearing loss, nosebleeds and sore throat.    Eyes: Negative for pain, discharge and redness.   Respiratory: Negative for cough, chest tightness, shortness of breath and wheezing.    Cardiovascular: Negative for chest pain and palpitations.   Gastrointestinal: Negative for abdominal distention, abdominal pain, blood in stool, constipation, diarrhea, nausea and vomiting.   Endocrine: Negative for cold intolerance, polydipsia, polyphagia and polyuria.   Genitourinary: Negative for dysuria, flank pain, frequency, hematuria and urgency.   Musculoskeletal: Negative for arthralgias, back pain, joint swelling and myalgias.   Skin: Negative for color change, pallor and rash.   Neurological: Negative for tremors, seizures, syncope, weakness and headaches.   Hematological: Negative for adenopathy. Does not bruise/bleed easily.   Psychiatric/Behavioral: Negative for behavioral problems, confusion, dysphoric mood, hallucinations and suicidal ideas. The patient is not nervous/anxious.         /80 (BP Location: Right arm)   Ht 170.2 cm (67\")   Wt 88 kg (194 lb)   BMI 30.38 kg/m²     Objective    Physical Exam  Constitutional:       Appearance: He is well-developed.   HENT:      Head: Normocephalic and atraumatic.   Eyes:      Conjunctiva/sclera: Conjunctivae normal.      Pupils: Pupils are equal, round, and reactive to light.   Neck:      Thyroid: No thyromegaly.   Cardiovascular:      Rate and Rhythm: Normal rate and regular rhythm.      Heart sounds: Normal heart sounds. No murmur heard.  Pulmonary:      Effort: " Pulmonary effort is normal.      Breath sounds: Normal breath sounds. No wheezing.   Abdominal:      General: Bowel sounds are normal. There is no distension.      Palpations: Abdomen is soft. There is no mass.      Tenderness: There is no abdominal tenderness.      Hernia: No hernia is present.   Genitourinary:     Comments: No lesions noted  Musculoskeletal:         General: No tenderness. Normal range of motion.      Cervical back: Normal range of motion and neck supple.   Lymphadenopathy:      Cervical: No cervical adenopathy.   Skin:     General: Skin is warm and dry.      Findings: No rash.   Neurological:      Mental Status: He is alert and oriented to person, place, and time.      Cranial Nerves: No cranial nerve deficit.   Psychiatric:         Thought Content: Thought content normal.       Admission on 2023, Discharged on 2023   Component Date Value Ref Range Status   • Reference Lab Report 2023    Final                    Value:[facility]  [address] [place] [address]  Phone: [phone] or [phone]  Fax: [phone]  Luis Carranza M.D., Medical Director    PATIENT NAME                           LABORATORY NO.  1800  [facility]                        [id number]  [id number]                         AGE              SEX  SSN           CLIENT REF #  [facility]           43      1980                    [id number]    [place]                       REQUESTING M.D.     ATTENDING M.D.     COPY TO[address]  DATE COLLECTED      DATE RECEIVED      DATE REPORTED  2023    DIAGNOSIS:  SIGMOID COLON POLYP:  Tubular adenoma    PAMELA/nhi    CLINICAL HISTORY:  Encounter for screening for malignant neoplasm of colon, family history of  colon cancer    SPECIMENS RECEIVED:  SIGMOID  COLON                           POLYP    MICROSCOPIC DESCRIPTION:  Tissue blocks are prepared and slides are examined microscopically on all  specimens. See diagnosis for details.    Professional interpretation rendered by Chuy Deng M.D. at [facility], [address] [place] [address].    GROSS DESCRIPTION:  Labeled sigmoid colon polyp is a 0.6 x 0.3 x 0.2 cm portion of tan soft tissue,  submitted entirely in 1 block.  MTH    REVIEWED, DIAGNOSED AND ELECTRONICALLY  SIGNED BY:    Chuy Deng M.D.  CPT CODES:  68680       Assessment & Plan    No diagnosis found..   1.  Family history of colon cancer and adenomatous colon polyp, repeat colonoscopy in 3 years.  2.  Diverticulosis, add high-fiber diet.  3.  Obesity, recommend exercise and diet control.  4.  Alcohol usage, recommend cessation.    Orders placed during this encounter include:  No orders of the defined types were placed in this encounter.      * Surgery not found *    Review and/or summary of lab tests, radiology, procedures, medications. Review and summary of old records and obtaining of history. The risks and benefits of my recommendations, as well as other treatment options were discussed with the patient today. Questions were answered.    No orders of the defined types were placed in this encounter.      Follow-up: Return if symptoms worsen or fail to improve.               Results for orders placed or performed during the hospital encounter of 23   TISSUE EXAM, [facility] ([facility])    Specimen: Large Intestine, Sigmoid Colon; Polyp   Result Value Ref Range    Reference Lab Report       [facility]  [address] [place] [address]  Phone: [phone] or [phone]  Fax: [phone]  Luis Carranza M.D., Medical Director    PATIENT NAME                           LABORATORY NO.  1800  [facility]                        [id number]  [id number]                         AGE               SEX  [facility]           CLIENT REF #  [facility]           43      1980                    [id number]    [place]                       REQUESTING M.D.     ATTENDING M.D.     COPY TO.  [address] [place] [address]                 [place] [address]             LIS  DATE COLLECTED      DATE RECEIVED      DATE REPORTED  02/28/2023 03/01/2023 03/02/2023    DIAGNOSIS:  SIGMOID COLON POLYP:  Tubular adenoma    JBS/sdl    CLINICAL HISTORY:  Encounter for screening for malignant neoplasm of colon, family history of  colon cancer    SPECIMENS RECEIVED:  SIGMOID COLON  POLYP    MICROSCOPIC DESCRIPTION:  Tissue blocks are prepared and slides are examined microscopically on all  specimens. See diagnosis for details.    Professional interpretation rendered by Chuy Deng M.D. at [facility], [address] [place] [address] , [place] [address].    GROSS DESCRIPTION:  Labeled sigmoid colon polyp is a 0.6 x 0.3 x 0.2 cm portion of tan soft tissue,  submitted entirely in 1 block.  MTH    REVIEWED, DIAGNOSED AND ELECTRONICALLY  SIGNED BY:    Chuy Deng M.D.  CPT CODES:  [id number]     Results for orders placed or performed in visit on 01/12/23   CBC Auto Differential    Specimen: Blood   Result Value Ref Range    WBC 7.73 3.40 - 10.80 10*3/mm3    RBC 4.82 4.14 - 5.80 10*6/mm3    Hemoglobin 14.8 13.0 - 17.7 g/dL    Hematocrit 45.8 37.5 - 51.0 %    MCV 95.0 79.0 - 97.0 fL    MCH 30.7 26.6 - 33.0 pg    MCHC 32.3 31.5 - 35.7 g/dL    RDW 12.8 12.3 - 15.4 %    RDW-SD 44.1 37.0 - 54.0 fl    MPV 9.6 6.0 - 12.0 fL    Platelets 306 140 - 450 10*3/mm3    Neutrophil % 49.6 42.7 - 76.0 %    Lymphocyte % 33.5 19.6 - 45.3 %    Monocyte % 9.4 5.0 - 12.0 %    Eosinophil % 5.8 0.3 - 6.2 %    Basophil % 1.4 0.0 - 1.5 %    Immature Grans % 0.3 0.0 - 0.5 %    Neutrophils, Absolute 3.83 1.70 - 7.00 10*3/mm3    Lymphocytes, Absolute 2.59 0.70 - 3.10 10*3/mm3    Monocytes,  Absolute 0.73 0.10 - 0.90 10*3/mm3    Eosinophils, Absolute 0.45 (H) 0.00 - 0.40 10*3/mm3    Basophils, Absolute 0.11 0.00 - 0.20 10*3/mm3    Immature Grans, Absolute 0.02 0.00 - 0.05 10*3/mm3    nRBC 0.0 0.0 - 0.2 /100 WBC   Lipid panel    Specimen: Blood   Result Value Ref Range    Total Cholesterol 207 (H) 0 - 200 mg/dL    Triglycerides 127 0 - 150 mg/dL    HDL Cholesterol 50 40 - 60 mg/dL    LDL Cholesterol  134 (H) 0 - 100 mg/dL    VLDL Cholesterol 23 5 - 40 mg/dL    LDL/HDL Ratio 2.63    Comprehensive metabolic panel    Specimen: Blood   Result Value Ref Range    Glucose 81 65 - 99 mg/dL    BUN 14 6 - 20 mg/dL    Creatinine 0.74 (L) 0.76 - 1.27 mg/dL    Sodium 137 136 - 145 mmol/L    Potassium 3.9 3.5 - 5.2 mmol/L    Chloride 101 98 - 107 mmol/L    CO2 23.5 22.0 - 29.0 mmol/L    Calcium 9.2 8.6 - 10.5 mg/dL    Total Protein 7.4 6.0 - 8.5 g/dL    Albumin 4.5 3.5 - 5.2 g/dL    ALT (SGPT) 38 1 - 41 U/L    AST (SGOT) 35 1 - 40 U/L    Alkaline Phosphatase 84 39 - 117 U/L    Total Bilirubin 1.2 0.0 - 1.2 mg/dL    Globulin 2.9 gm/dL    A/G Ratio 1.6 g/dL    BUN/Creatinine Ratio 18.9 7.0 - 25.0    Anion Gap 12.5 5.0 - 15.0 mmol/L    eGFR 113.9 >60.0 mL/min/1.73   Results for orders placed or performed in visit on 04/15/21   TSH    Specimen: Blood   Result Value Ref Range    TSH 1.610 0.270 - 4.200 uIU/mL   Hemoglobin A1c    Specimen: Blood   Result Value Ref Range    Hemoglobin A1C 4.50 (L) 4.80 - 5.60 %   Lipid panel    Specimen: Blood   Result Value Ref Range    Total Cholesterol 232 (H) 0 - 200 mg/dL    Triglycerides 109 0 - 150 mg/dL    HDL Cholesterol 50 40 - 60 mg/dL    LDL Cholesterol  163 (H) 0 - 100 mg/dL    VLDL Cholesterol 19 5 - 40 mg/dL    LDL/HDL Ratio 3.20          This document has been electronically signed by Laurie Jimenez MD on March 26, 2023 21:57 CDT

## 2023-08-10 ENCOUNTER — TELEPHONE (OUTPATIENT)
Dept: FAMILY MEDICINE CLINIC | Facility: CLINIC | Age: 43
End: 2023-08-10
Payer: COMMERCIAL

## 2023-08-10 DIAGNOSIS — E78.2 MODERATE MIXED HYPERLIPIDEMIA NOT REQUIRING STATIN THERAPY: ICD-10-CM

## 2023-08-10 DIAGNOSIS — I10 ESSENTIAL HYPERTENSION: ICD-10-CM

## 2023-08-10 RX ORDER — LOSARTAN POTASSIUM 50 MG/1
50 TABLET ORAL DAILY
Qty: 30 TABLET | Refills: 11 | Status: SHIPPED | OUTPATIENT
Start: 2023-08-10

## 2023-08-10 RX ORDER — SIMVASTATIN 10 MG
10 TABLET ORAL NIGHTLY
Qty: 30 TABLET | Refills: 11 | Status: SHIPPED | OUTPATIENT
Start: 2023-08-10

## 2023-08-10 NOTE — TELEPHONE ENCOUNTER
Incoming Refill Request      Medication requested (name and dose): simvastatin (ZOCOR) 10 MG tablet   losartan (COZAAR) 50 MG tablet     Pharmacy where request should be sent: [facility]     Additional details provided by patient: Pt is out of meds     Best call back number: [phone]      Does the patient have less than a 3 day supply:  [x] Yes  [] No    Keyshawn Mendez Rep  08/10/23, 14:10 CDT

## (undated) DEVICE — SINGLE-USE BIOPSY FORCEPS: Brand: RADIAL JAW 4

## (undated) DEVICE — CANN SMPL SOFTECH BIFLO ETCO2 A/M 7FT